# Patient Record
Sex: FEMALE | Race: NATIVE HAWAIIAN OR OTHER PACIFIC ISLANDER
[De-identification: names, ages, dates, MRNs, and addresses within clinical notes are randomized per-mention and may not be internally consistent; named-entity substitution may affect disease eponyms.]

---

## 2018-04-30 ENCOUNTER — HOSPITAL ENCOUNTER (INPATIENT)
Dept: HOSPITAL 14 - H.REHABAC | Age: 83
LOS: 33 days | Discharge: NURSING, REHAB OR LONG-TERM CARE | DRG: 57 | End: 2018-06-02
Attending: FAMILY MEDICINE | Admitting: FAMILY MEDICINE
Payer: MEDICARE

## 2018-04-30 VITALS — BODY MASS INDEX: 25.7 KG/M2

## 2018-04-30 DIAGNOSIS — Z79.84: ICD-10-CM

## 2018-04-30 DIAGNOSIS — R26.9: ICD-10-CM

## 2018-04-30 DIAGNOSIS — I10: ICD-10-CM

## 2018-04-30 DIAGNOSIS — Z79.899: ICD-10-CM

## 2018-04-30 DIAGNOSIS — Z91.013: ICD-10-CM

## 2018-04-30 DIAGNOSIS — Z86.010: ICD-10-CM

## 2018-04-30 DIAGNOSIS — R39.198: ICD-10-CM

## 2018-04-30 DIAGNOSIS — E78.5: ICD-10-CM

## 2018-04-30 DIAGNOSIS — Z88.6: ICD-10-CM

## 2018-04-30 DIAGNOSIS — E03.9: ICD-10-CM

## 2018-04-30 DIAGNOSIS — I69.322: ICD-10-CM

## 2018-04-30 DIAGNOSIS — Z88.0: ICD-10-CM

## 2018-04-30 DIAGNOSIS — E22.2: ICD-10-CM

## 2018-04-30 DIAGNOSIS — R33.9: ICD-10-CM

## 2018-04-30 DIAGNOSIS — E66.9: ICD-10-CM

## 2018-04-30 DIAGNOSIS — K55.9: ICD-10-CM

## 2018-04-30 DIAGNOSIS — Z86.011: ICD-10-CM

## 2018-04-30 DIAGNOSIS — I69.351: Primary | ICD-10-CM

## 2018-04-30 DIAGNOSIS — E11.9: ICD-10-CM

## 2018-04-30 DIAGNOSIS — J45.909: ICD-10-CM

## 2018-04-30 DIAGNOSIS — M81.0: ICD-10-CM

## 2018-04-30 DIAGNOSIS — Z92.21: ICD-10-CM

## 2018-04-30 DIAGNOSIS — Z88.2: ICD-10-CM

## 2018-04-30 DIAGNOSIS — F41.9: ICD-10-CM

## 2018-04-30 DIAGNOSIS — Z85.3: ICD-10-CM

## 2018-04-30 DIAGNOSIS — I69.328: ICD-10-CM

## 2018-04-30 DIAGNOSIS — R32: ICD-10-CM

## 2018-05-08 PROCEDURE — F07Z9FZ GAIT TRAINING/FUNCTIONAL AMBULATION TREATMENT USING ASSISTIVE, ADAPTIVE, SUPPORTIVE OR PROTECTIVE EQUIPMENT: ICD-10-PCS

## 2018-05-08 PROCEDURE — F08Z0ZZ BATHING/SHOWERING TECHNIQUES TREATMENT: ICD-10-PCS

## 2018-05-08 PROCEDURE — F07Z5ZZ BED MOBILITY TREATMENT: ICD-10-PCS

## 2018-05-08 PROCEDURE — F08Z4FZ HOME MANAGEMENT TREATMENT USING ASSISTIVE, ADAPTIVE, SUPPORTIVE OR PROTECTIVE EQUIPMENT: ICD-10-PCS

## 2018-05-08 PROCEDURE — F08Z1ZZ DRESSING TECHNIQUES TREATMENT: ICD-10-PCS

## 2018-05-08 PROCEDURE — F06Z6ZZ COMMUNICATIVE/COGNITIVE INTEGRATION SKILLS TREATMENT: ICD-10-PCS

## 2018-05-08 PROCEDURE — F07M6FZ THERAPEUTIC EXERCISE TREATMENT OF MUSCULOSKELETAL SYSTEM - WHOLE BODY USING ASSISTIVE, ADAPTIVE, SUPPORTIVE OR PROTECTIVE EQUIPMENT: ICD-10-PCS

## 2018-05-08 RX ADMIN — INSULIN LISPRO SCH: 100 INJECTION, SOLUTION INTRAVENOUS; SUBCUTANEOUS at 21:42

## 2018-05-08 RX ADMIN — STANDARDIZED SENNA CONCENTRATE AND DOCUSATE SODIUM SCH TAB: 8.6; 5 TABLET, FILM COATED ORAL at 21:41

## 2018-05-09 LAB
ALBUMIN SERPL-MCNC: 3.6 G/DL (ref 3.5–5)
ALBUMIN/GLOB SERPL: 1.2 {RATIO} (ref 1–2.1)
ALT SERPL-CCNC: 52 U/L (ref 9–52)
AST SERPL-CCNC: 36 U/L (ref 14–36)
BUN SERPL-MCNC: 32 MG/DL (ref 7–17)
CALCIUM SERPL-MCNC: 9.4 MG/DL (ref 8.4–10.2)
ERYTHROCYTE [DISTWIDTH] IN BLOOD BY AUTOMATED COUNT: 14.2 % (ref 11.5–14.5)
GFR NON-AFRICAN AMERICAN: 53
HDLC SERPL-MCNC: 40 MG/DL (ref 30–70)
HGB BLD-MCNC: 10.7 G/DL (ref 12–16)
LDLC SERPL-MCNC: 68 MG/DL (ref 0–129)
MCH RBC QN AUTO: 30.7 PG (ref 27–31)
MCHC RBC AUTO-ENTMCNC: 33.5 G/DL (ref 33–37)
MCV RBC AUTO: 91.5 FL (ref 81–99)
PLATELET # BLD: 343 K/UL (ref 130–400)
RBC # BLD AUTO: 3.47 MIL/UL (ref 3.8–5.2)
WBC # BLD AUTO: 7.4 K/UL (ref 4.8–10.8)

## 2018-05-09 RX ADMIN — INSULIN LISPRO SCH: 100 INJECTION, SOLUTION INTRAVENOUS; SUBCUTANEOUS at 21:16

## 2018-05-09 RX ADMIN — PANTOPRAZOLE SODIUM SCH MG: 40 TABLET, DELAYED RELEASE ORAL at 06:14

## 2018-05-09 RX ADMIN — STANDARDIZED SENNA CONCENTRATE AND DOCUSATE SODIUM SCH TAB: 8.6; 5 TABLET, FILM COATED ORAL at 21:15

## 2018-05-09 RX ADMIN — INSULIN LISPRO SCH U: 100 INJECTION, SOLUTION INTRAVENOUS; SUBCUTANEOUS at 08:00

## 2018-05-09 RX ADMIN — INSULIN LISPRO SCH U: 100 INJECTION, SOLUTION INTRAVENOUS; SUBCUTANEOUS at 12:15

## 2018-05-09 RX ADMIN — NIFEDIPINE SCH MG: 30 TABLET, FILM COATED, EXTENDED RELEASE ORAL at 09:11

## 2018-05-09 RX ADMIN — INSULIN LISPRO SCH U: 100 INJECTION, SOLUTION INTRAVENOUS; SUBCUTANEOUS at 16:45

## 2018-05-09 RX ADMIN — ENOXAPARIN SODIUM SCH MG: 40 INJECTION SUBCUTANEOUS at 09:10

## 2018-05-09 RX ADMIN — STANDARDIZED SENNA CONCENTRATE AND DOCUSATE SODIUM SCH TAB: 8.6; 5 TABLET, FILM COATED ORAL at 09:13

## 2018-05-09 NOTE — PCM.OPOC
Physiatry Overall Plan of Care





- Overall Plan of Care


Estimated Length of Stay in Weeks: 3


Rehab Impairment: Mobility, Gait, Speech, Balance, Coordination


Etiologic Diagnosis: Cerebrovascular Accident





- Anticipated Interventions


Physical Therapy:: Yes


Occupational Therapy:: Yes


Speech Therapy:: Yes


Recreational Therapy:: Yes





- Therapy Goals


Bed Mobility: Supervision


Ambulation: Minimal Assistance


Functional Positional Changes:: Supervision





- Discharge Plan


Identification of Barriers to Discharge: Home Situation


Discharge Destination: Home

## 2018-05-09 NOTE — CP.PCM.CON
History of Present Illness





- History of Present Illness


History of Present Illness: 





This patient is 82 years of age female [facility] because 

of acute CVA was weakness on the right side.


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


I was called to see the patient for further evaluation regarding hyponatremia..


Past medical history as noted acute CVA in diabetes mellitus hypertension. And 

patient in the rehabilitation Center now for acute CVA.


Social history noncontributory


Review of the 14 system unremarkable except for what mentioned in the history 

and physical.





Review of Systems





- Constitutional


Constitutional: Weakness.  absent: Chills





- Breasts


Breasts: As Per HPI





- Cardiovascular


Cardiovascular: absent: Acrocyanosis, Chest Pain, Dyspnea, Edema





- Respiratory


Respiratory: absent: Cough, Dyspnea, Hemoptysis





- Gastrointestinal


Gastrointestinal: absent: Abdominal Pain





- Musculoskeletal


Musculoskeletal: Abnormal Gait, Limited Range of Motion, Muscle Weakness.  

absent: As Per HPI





- Neurological


Neurological: As Per HPI, Abnormal Gait.  absent: Syncope





- Psychiatric


Psychiatric: Abnormal Sleep Pattern





- Hematologic/Lymphatic


Hematologic: absent: Easy Bleeding





Past Patient History





- Infectious Disease


Hx of Infectious Diseases: None





- Tetanus Immunizations


Tetanus Immunization: Up to Date





- Past Medical History & Family History


Past Medical History?: Yes





- Past Social History


Smoking Status: Never Smoked





- CARDIAC


Hx Hypertension: Yes





- PULMONARY


Hx Asthma: Yes





- NEUROLOGICAL


HX Cerebrovascular Accident: Yes (4/23/2018)


Hx Paralysis: No


Other/Comment: meningioma





- HEENT


Hx HEENT Problems: No





- ENDOCRINE/METABOLIC


Hx Diabetes Mellitus Type 2: Yes


Hx Hypothyroidism: Yes





- HEMATOLOGICAL/ONCOLOGICAL


Hx Cancer: Yes





- MUSCULOSKELETAL/RHEUMATOLOGICAL


Hx Falls: No


Hx Osteoporosis: Yes





- GASTROINTESTINAL


Hx Gastrointestinal Disorders: No


Other/Comment: + ISCHEMIC COLITIS , COLONIC POLYPS





- GENITOURINARY/GYNECOLOGICAL


Hx Genitourinary Disorders: No


Hx Incontinence: Yes





- PSYCHIATRIC


Hx Anxiety: Yes


Hx Substance Use: No





- SURGICAL HISTORY


Hx Surgeries: Yes


Hx Breast Biopsy: Yes (left lumpectomy S/P CHEMO)


Hx Mastectomy: Yes (right 1988, S/P CHEMO)


Other/Comment: LAPAROTOMY 1971





- ANESTHESIA


Hx Anesthesia: Yes


Hx Anesthesia Reactions: No


Hx Malignant Hyperthermia: No


Has any member of the family had a problem w/ anesthesia?: No





Meds


Allergies/Adverse Reactions: 


 Allergies











Allergy/AdvReac Type Severity Reaction Status Date / Time


 


iodine Allergy Mild RASH Verified 05/08/18 17:41


 


codeine Allergy  ANAPHYLAXIS Verified 05/08/18 17:41


 


FISH Allergy  ANAPHYLAXIS Verified 05/08/18 17:41


 


Penicillins Allergy  RASH Verified 05/08/18 17:41


 


shellfish derived Allergy  ANAPHYLAXIS Verified 05/08/18 17:41


 


Sulfa (Sulfonamide Allergy  ANAPHYLAXIS Verified 05/08/18 17:41





Antibiotics)     














- Medications


Medications: 


 Current Medications





Acetaminophen (Tylenol 325mg Tab)  325 mg PO Q6 PRN


   PRN Reason: Pain 1-10


Alprazolam (Xanax)  0.25 mg PO TID PRN


   PRN Reason: Anxiety


   Stop: 05/15/18 18:10


Aspirin (Aspirin Chewable)  81 mg PO DAILY [facility]


   Last Admin: 05/09/18 09:12 Dose:  81 mg


Atorvastatin Calcium (Lipitor)  40 mg PO HS [facility]


   Last Admin: 05/08/18 21:40 Dose:  40 mg


Clotrimazole (Lotrimin 1% Cream)  1 applic TOP 0600,1800 [facility]


Enoxaparin Sodium (Lovenox)  40 mg SC DAILY [facility]


   PRN Reason: Protocol


   Last Admin: 05/09/18 09:10 Dose:  40 mg


Hydralazine HCl (Apresoline)  50 mg PO Q8 [facility]


   Last Admin: 05/09/18 06:13 Dose:  50 mg


Insulin Human Lispro (Humalog)  0 units SC ACHS [facility]


   PRN Reason: Protocol


   Last Admin: 05/09/18 08:00 Dose:  2 u


Lactulose (Enulose)  10 gm PO DAILY PRN


   PRN Reason: Constipation


Levothyroxine Sodium (Synthroid)  25 mcg PO 0630 [facility]


   Last Admin: 05/09/18 06:14 Dose:  25 mcg


Losartan Potassium (Cozaar)  50 mg PO DAILY [facility]


   Last Admin: 05/09/18 09:11 Dose:  50 mg


Metformin HCl (Glucophage)  1,000 mg PO BIDWM [facility]


Metoprolol Tartrate (Lopressor)  50 mg PO Q12 [facility]


   Last Admin: 05/09/18 09:12 Dose:  50 mg


Nifedipine (Procardia Xl)  30 mg PO DAILY [facility]


   Last Admin: 05/09/18 09:11 Dose:  30 mg


Pantoprazole Sodium (Protonix Ec Tab)  40 mg PO 0630 [facility]


   Last Admin: 05/09/18 06:14 Dose:  40 mg


Senna/Docusate Sodium (Senokot S 50 Mg-8.6 Mg)  2 tab PO Q12 [facility]


   Last Admin: 05/09/18 09:13 Dose:  2 tab


Sitagliptin Phosphate (Januvia)  100 mg PO DAILY [facility]











Physical Exam





- Constitutional


Appears: No Acute Distress





- ENT Exam


ENT Exam: Mucous Membranes Moist





- Neck Exam


Neck exam: Negative for: Lymphadenopathy





- Respiratory Exam


Respiratory Exam: NORMAL BREATHING PATTERN.  absent: Chest Wall Tenderness, 

Rhonchi, Wheezes





- Cardiovascular Exam


Cardiovascular Exam: absent: Gallop, JVD, Rubs





- GI/Abdominal Exam


GI & Abdominal Exam: absent: Distended, Guarding, Normal Bowel Sounds





- Extremities Exam


Extremities exam: Negative for: calf tenderness





- Back Exam


Back exam: absent: CVA tenderness (L), CVA tenderness (R)





- Neurological Exam


Neurological exam: Alert





- Psychiatric Exam


Psychiatric exam: Normal Affect





Results





- Vital Signs


Recent Vital Signs: 


 Last Vital Signs











Temp  97.3 F L  05/09/18 09:04


 


Pulse  88   05/09/18 09:12


 


Resp  20   05/09/18 09:04


 


BP  135/60   05/09/18 09:12


 


Pulse Ox  97   05/09/18 09:04














- Labs


Result Diagrams: 


 05/09/18 05:40





 05/09/18 05:40


Labs: 


 Laboratory Results - last 24 hr











  05/08/18 05/09/18 05/09/18





  21:15 05:32 05:40


 


WBC    7.4


 


RBC    3.47 L


 


Hgb    10.7 L


 


Hct    31.8 L


 


MCV    91.5


 


MCH    30.7


 


MCHC    33.5


 


RDW    14.2


 


Plt Count    343


 


Sodium   


 


Potassium   


 


Chloride   


 


Carbon Dioxide   


 


Anion Gap   


 


BUN   


 


Creatinine   


 


Est GFR ( Amer)   


 


Est GFR (Non-Af Amer)   


 


POC Glucose (mg/dL)  252 H  157 H 


 


Random Glucose   


 


Hemoglobin A1c   


 


Calcium   


 


Total Bilirubin   


 


AST   


 


ALT   


 


Alkaline Phosphatase   


 


Total Protein   


 


Albumin   


 


Globulin   


 


Albumin/Globulin Ratio   


 


Triglycerides   


 


Cholesterol   


 


LDL Cholesterol Direct   


 


HDL Cholesterol   


 


TSH 3rd Generation   














  05/09/18 05/09/18





  05:40 05:40


 


WBC  


 


RBC  


 


Hgb  


 


Hct  


 


MCV  


 


MCH  


 


MCHC  


 


RDW  


 


Plt Count  


 


Sodium  134 


 


Potassium  3.9 


 


Chloride  100 


 


Carbon Dioxide  18 L 


 


Anion Gap  20 


 


BUN  32 H 


 


Creatinine  1.0 


 


Est GFR ( Amer)  > 60 


 


Est GFR (Non-Af Amer)  53 


 


POC Glucose (mg/dL)  


 


Random Glucose  157 H 


 


Hemoglobin A1c   7.5 H


 


Calcium  9.4 


 


Total Bilirubin  0.5 


 


AST  36 


 


ALT  52 


 


Alkaline Phosphatase  46 


 


Total Protein  6.5 


 


Albumin  3.6 


 


Globulin  2.9 


 


Albumin/Globulin Ratio  1.2 


 


Triglycerides  99 


 


Cholesterol  136 


 


LDL Cholesterol Direct  68 


 


HDL Cholesterol  40 


 


TSH 3rd Generation  4.32 














Assessment & Plan


(1) Hyponatremia


Assessment and Plan: 


Patient prongs from from [facility] with diagnosis of SIADH. With 

hyponatremia she has been receiving apparently intermittently  Samsca.


No serum sodium 134 therefore we will hold Samsca and keep monitoring serum 

sodium we will repeat tomorrow.


Patient also brought because of acute CVA and she is receiving physiotherapy.


Patient also diabetic continual glycemic and diabetic control.


Status: Acute

## 2018-05-09 NOTE — CP.PCM.HP
History of Present Illness





- History of Present Illness


History of Present Illness: 





81 yo,f, PMhx/o HTN, DM, HLD, Hypothyroidism, B/L  breast Ca s/p chemo 1998, 

CVA 4/23/18 admitted and treated  in [facility] [place], who was admitted in acute rehab for 

rehabilitation. Patient on admission 4/23/18 was c/o right side hemiplegia, 

slurred speech, that is still residual. Patient on evaluation noticed with 

dysarthria, able to talk with some difficult. She denies chest pain, SOB, fever

, cough, n,v,d,abd pain





PMHX: HTN, DM, HLD, hypothyroidism, B/L  breast Ca s/p chemo 1998, CVA 4/23/18


Allergies: Sulfa, codeine, shellfish, penicillin








Present on Admission





- Present on Admission


Any Indicators Present on Admission: No


History of DVT/PE: No


History of Uncontrolled Diabetes: No


Urinary Catheter: No


Decubitus Ulcer Present: No





Review of Systems





- Review of Systems


All systems: reviewed and no additional remarkable complaints except





- Cardiovascular


Cardiovascular: As Per HPI





- Respiratory


Respiratory: As Per HPI





- Neurological


Neurological: Weakness





Past Patient History





- Infectious Disease


Hx of Infectious Diseases: None





- Tetanus Immunizations


Tetanus Immunization: Up to Date





- Past Medical History & Family History


Past Medical History?: Yes





- Past Social History


Smoking Status: Never Smoked





- CARDIAC


Hx Hypertension: Yes





- PULMONARY


Hx Asthma: Yes





- NEUROLOGICAL


HX Cerebrovascular Accident: Yes (4/23/2018)


Hx Paralysis: No


Other/Comment: meningioma





- HEENT


Hx HEENT Problems: No





- ENDOCRINE/METABOLIC


Hx Diabetes Mellitus Type 2: Yes


Hx Hypothyroidism: Yes





- HEMATOLOGICAL/ONCOLOGICAL


Hx Cancer: Yes





- MUSCULOSKELETAL/RHEUMATOLOGICAL


Hx Falls: No


Hx Osteoporosis: Yes





- GASTROINTESTINAL


Hx Gastrointestinal Disorders: No


Other/Comment: + ISCHEMIC COLITIS , COLONIC POLYPS





- GENITOURINARY/GYNECOLOGICAL


Hx Genitourinary Disorders: No


Hx Incontinence: Yes





- PSYCHIATRIC


Hx Anxiety: Yes


Hx Substance Use: No





- SURGICAL HISTORY


Hx Surgeries: Yes


Hx Breast Biopsy: Yes (left lumpectomy S/P CHEMO)


Hx Mastectomy: Yes (right 1988, S/P CHEMO)


Other/Comment: LAPAROTOMY 1971





- ANESTHESIA


Hx Anesthesia: Yes


Hx Anesthesia Reactions: No


Hx Malignant Hyperthermia: No


Has any member of the family had a problem w/ anesthesia?: No





Meds


Allergies/Adverse Reactions: 


 Allergies











Allergy/AdvReac Type Severity Reaction Status Date / Time


 


iodine Allergy Mild RASH Verified 05/08/18 17:41


 


codeine Allergy  ANAPHYLAXIS Verified 05/08/18 17:41


 


FISH Allergy  ANAPHYLAXIS Verified 05/08/18 17:41


 


Penicillins Allergy  RASH Verified 05/08/18 17:41


 


shellfish derived Allergy  ANAPHYLAXIS Verified 05/08/18 17:41


 


Sulfa (Sulfonamide Allergy  ANAPHYLAXIS Verified 05/08/18 17:41





Antibiotics)     














Physical Exam





- Constitutional


Appears: Toxic, No Acute Distress





- Head Exam


Head Exam: ATRAUMATIC, NORMOCEPHALIC





- Eye Exam


Eye Exam: Normal appearance





- ENT Exam


ENT Exam: Mucous Membranes Moist





- Neck Exam


Neck exam: Positive for: Normal Inspection





- Respiratory Exam


Respiratory Exam: Clear to Auscultation Bilateral.  absent: Rales, Rhonchi, 

Wheezes, Stridor





- Cardiovascular Exam


Cardiovascular Exam: REGULAR RHYTHM, +S1, +S2





- GI/Abdominal Exam


GI & Abdominal Exam: Normal Bowel Sounds, Soft.  absent: Guarding, Rebound, 

Tenderness





- Extremities Exam


Extremities exam: Negative for: calf tenderness, pedal edema





Results





- Vital Signs


Recent Vital Signs: 





 Last Vital Signs











Temp  97.3 F L  05/09/18 09:04


 


Pulse  68   05/09/18 13:14


 


Resp  20   05/09/18 09:04


 


BP  130/63   05/09/18 13:14


 


Pulse Ox  97   05/09/18 09:04














- Labs


Result Diagrams: 


 05/09/18 05:40





 05/09/18 05:40


Labs: 





 Laboratory Results - last 24 hr











  05/08/18 05/09/18 05/09/18





  21:15 05:32 05:40


 


WBC    7.4


 


RBC    3.47 L


 


Hgb    10.7 L


 


Hct    31.8 L


 


MCV    91.5


 


MCH    30.7


 


MCHC    33.5


 


RDW    14.2


 


Plt Count    343


 


Sodium   


 


Potassium   


 


Chloride   


 


Carbon Dioxide   


 


Anion Gap   


 


BUN   


 


Creatinine   


 


Est GFR ( Amer)   


 


Est GFR (Non-Af Amer)   


 


POC Glucose (mg/dL)  252 H  157 H 


 


Random Glucose   


 


Hemoglobin A1c   


 


Calcium   


 


Total Bilirubin   


 


AST   


 


ALT   


 


Alkaline Phosphatase   


 


Total Protein   


 


Albumin   


 


Globulin   


 


Albumin/Globulin Ratio   


 


Triglycerides   


 


Cholesterol   


 


LDL Cholesterol Direct   


 


HDL Cholesterol   


 


TSH 3rd Generation   














  05/09/18 05/09/18 05/09/18





  05:40 05:40 11:59


 


WBC   


 


RBC   


 


Hgb   


 


Hct   


 


MCV   


 


MCH   


 


MCHC   


 


RDW   


 


Plt Count   


 


Sodium  134  


 


Potassium  3.9  


 


Chloride  100  


 


Carbon Dioxide  18 L  


 


Anion Gap  20  


 


BUN  32 H  


 


Creatinine  1.0  


 


Est GFR ( Amer)  > 60  


 


Est GFR (Non-Af Amer)  53  


 


POC Glucose (mg/dL)    206 H


 


Random Glucose  157 H  


 


Hemoglobin A1c   7.5 H 


 


Calcium  9.4  


 


Total Bilirubin  0.5  


 


AST  36  


 


ALT  52  


 


Alkaline Phosphatase  46  


 


Total Protein  6.5  


 


Albumin  3.6  


 


Globulin  2.9  


 


Albumin/Globulin Ratio  1.2  


 


Triglycerides  99  


 


Cholesterol  136  


 


LDL Cholesterol Direct  68  


 


HDL Cholesterol  40  


 


TSH 3rd Generation  4.32  














Assessment & Plan





- Assessment and Plan (Free Text)


Plan: 





Assessment/Plan 





1) CVA


-chronic with  residual right hemiparesis


-record on paper chart: MRI brain left pontine infarct. 2 right sphenoid 

meningiomas. 


-Pt/OT


-speech therapy 





2) DM


hga1c 7.3


-Controlled for her age


-metformin 1000 BID


-Januvia 100 mg daily 





3) HTN


-c/w losartan,metropolol





4) HLD


c/w Lipitor 40 mg daily 








5) Hypothyroidism


c/w home medications





6) Hyponatremia


-may secondary to SIADH caused for medication 


-Nephrology consult appreciated: edgar tolvactan


-f/u BMP





7) DVT prophylaxis


-SCD

## 2018-05-09 NOTE — CP.PCM.CON
History of Present Illness





- History of Present Illness


History of Present Illness: 





82 year old female with CVa admitted for acute reab with diagnosis of CVA, HTn, 

hypothyroidism, breast CA





Review of Systems





- Neurological


Neurological: Abnormal Gait, Weakness





Past Patient History





- Infectious Disease


Hx of Infectious Diseases: None





- Tetanus Immunizations


Tetanus Immunization: Up to Date





- Past Medical History & Family History


Past Medical History?: Yes





- Past Social History


Smoking Status: Never Smoked





- CARDIAC


Hx Hypertension: Yes





- PULMONARY


Hx Asthma: Yes





- NEUROLOGICAL


HX Cerebrovascular Accident: Yes (4/23/2018)


Hx Paralysis: No


Other/Comment: meningioma





- HEENT


Hx HEENT Problems: No





- ENDOCRINE/METABOLIC


Hx Diabetes Mellitus Type 2: Yes


Hx Hypothyroidism: Yes





- HEMATOLOGICAL/ONCOLOGICAL


Hx Cancer: Yes





- MUSCULOSKELETAL/RHEUMATOLOGICAL


Hx Falls: No


Hx Osteoporosis: Yes





- GASTROINTESTINAL


Hx Gastrointestinal Disorders: No


Other/Comment: + ISCHEMIC COLITIS , COLONIC POLYPS





- GENITOURINARY/GYNECOLOGICAL


Hx Genitourinary Disorders: No


Hx Incontinence: Yes





- PSYCHIATRIC


Hx Anxiety: Yes


Hx Substance Use: No





- SURGICAL HISTORY


Hx Surgeries: Yes


Hx Breast Biopsy: Yes (left lumpectomy S/P CHEMO)


Hx Mastectomy: Yes (right 1988, S/P CHEMO)


Other/Comment: LAPAROTOMY 1971





- ANESTHESIA


Hx Anesthesia: Yes


Hx Anesthesia Reactions: No


Hx Malignant Hyperthermia: No


Has any member of the family had a problem w/ anesthesia?: No





Meds


Allergies/Adverse Reactions: 


 Allergies











Allergy/AdvReac Type Severity Reaction Status Date / Time


 


iodine Allergy Mild RASH Verified 05/08/18 17:41


 


codeine Allergy  ANAPHYLAXIS Verified 05/08/18 17:41


 


FISH Allergy  ANAPHYLAXIS Verified 05/08/18 17:41


 


Penicillins Allergy  RASH Verified 05/08/18 17:41


 


shellfish derived Allergy  ANAPHYLAXIS Verified 05/08/18 17:41


 


Sulfa (Sulfonamide Allergy  ANAPHYLAXIS Verified 05/08/18 17:41





Antibiotics)     














- Medications


Medications: 


 Current Medications





Acetaminophen (Tylenol 325mg Tab)  325 mg PO Q6 PRN


   PRN Reason: Pain 1-10


Alprazolam (Xanax)  0.25 mg PO TID PRN


   PRN Reason: Anxiety


   Stop: 05/15/18 18:10


Aspirin (Aspirin Chewable)  81 mg PO DAILY [facility]


   Last Admin: 05/09/18 09:12 Dose:  81 mg


Atorvastatin Calcium (Lipitor)  40 mg PO HS [facility]


   Last Admin: 05/08/18 21:40 Dose:  40 mg


Clotrimazole (Lotrimin 1% Cream)  1 applic TOP 0600,1800 [facility]


Enoxaparin Sodium (Lovenox)  40 mg SC DAILY [facility]


   PRN Reason: Protocol


   Last Admin: 05/09/18 09:10 Dose:  40 mg


Hydralazine HCl (Apresoline)  50 mg PO Q8 [facility]


   Last Admin: 05/09/18 13:14 Dose:  50 mg


Insulin Human Lispro (Humalog)  0 units SC ACHS [facility]


   PRN Reason: Protocol


   Last Admin: 05/09/18 12:15 Dose:  3 u


Lactulose (Enulose)  10 gm PO DAILY [facility]N


   PRN Reason: Constipation


Levothyroxine Sodium (Synthroid)  25 mcg PO 0630 [facility]


   Last Admin: 05/09/18 06:14 Dose:  25 mcg


Losartan Potassium (Cozaar)  50 mg PO DAILY [facility]


   Last Admin: 05/09/18 09:11 Dose:  50 mg


Metformin HCl (Glucophage)  1,000 mg PO BIDWM [facility]


Metoprolol Tartrate (Lopressor)  50 mg PO Q12 [facility]


   Last Admin: 05/09/18 09:12 Dose:  50 mg


Nifedipine (Procardia Xl)  30 mg PO DAILY [facility]


   Last Admin: 05/09/18 09:11 Dose:  30 mg


Pantoprazole Sodium (Protonix Ec Tab)  40 mg PO 0630 [facility]


   Last Admin: 05/09/18 06:14 Dose:  40 mg


Senna/Docusate Sodium (Senokot S 50 Mg-8.6 Mg)  2 tab PO Q12 [facility]


   Last Admin: 05/09/18 09:13 Dose:  2 tab


Sitagliptin Phosphate (Januvia)  100 mg PO DAILY [facility]











Physical Exam





- Head Exam


Head Exam: ATRAUMATIC, NORMAL INSPECTION, NORMOCEPHALIC





- Eye Exam


Eye Exam: EOMI, Normal appearance, PERRL


Pupil Exam: NORMAL ACCOMODATION, PERRL





- ENT Exam


ENT Exam: Mucous Membranes Moist, Normal Exam





- Neck Exam


Neck exam: Positive for: Normal Inspection





- Respiratory Exam


Respiratory Exam: NORMAL BREATHING PATTERN





- Cardiovascular Exam


Cardiovascular Exam: REGULAR RHYTHM





- GI/Abdominal Exam


GI & Abdominal Exam: Normal Bowel Sounds





- Rectal Exam


Rectal Exam: NORMAL INSPECTION





-  Exam


External exam: NORMAL EXTERNAL EXAM





- Extremities Exam


Extremities exam: Positive for: normal inspection


Additional comments: 





right arm with increased tone, nonfunctional  right leg muscle strength is trace





- Back Exam


Back exam: NORMAL INSPECTION





- Neurological Exam


Neurological exam: Alert, CN II-XII Intact





- Psychiatric Exam


Psychiatric exam: Normal Affect, Normal Mood





- Skin


Skin Exam: Dry, Intact, Normal Color





Results





- Vital Signs


Recent Vital Signs: 


 Last Vital Signs











Temp  97.3 F L  05/09/18 09:04


 


Pulse  68   05/09/18 13:14


 


Resp  20   05/09/18 09:04


 


BP  130/63   05/09/18 13:14


 


Pulse Ox  97   05/09/18 09:04














- Labs


Result Diagrams: 


 05/09/18 05:40





 05/09/18 05:40


Labs: 


 Laboratory Results - last 24 hr











  05/08/18 05/09/18 05/09/18





  21:15 05:32 05:40


 


WBC    7.4


 


RBC    3.47 L


 


Hgb    10.7 L


 


Hct    31.8 L


 


MCV    91.5


 


MCH    30.7


 


MCHC    33.5


 


RDW    14.2


 


Plt Count    343


 


Sodium   


 


Potassium   


 


Chloride   


 


Carbon Dioxide   


 


Anion Gap   


 


BUN   


 


Creatinine   


 


Est GFR ( Amer)   


 


Est GFR (Non-Af Amer)   


 


POC Glucose (mg/dL)  252 H  157 H 


 


Random Glucose   


 


Hemoglobin A1c   


 


Calcium   


 


Total Bilirubin   


 


AST   


 


ALT   


 


Alkaline Phosphatase   


 


Total Protein   


 


Albumin   


 


Globulin   


 


Albumin/Globulin Ratio   


 


Triglycerides   


 


Cholesterol   


 


LDL Cholesterol Direct   


 


HDL Cholesterol   


 


TSH 3rd Generation   














  05/09/18 05/09/18 05/09/18





  05:40 05:40 11:59


 


WBC   


 


RBC   


 


Hgb   


 


Hct   


 


MCV   


 


MCH   


 


MCHC   


 


RDW   


 


Plt Count   


 


Sodium  134  


 


Potassium  3.9  


 


Chloride  100  


 


Carbon Dioxide  18 L  


 


Anion Gap  20  


 


BUN  32 H  


 


Creatinine  1.0  


 


Est GFR ( Amer)  > 60  


 


Est GFR (Non-Af Amer)  53  


 


POC Glucose (mg/dL)    206 H


 


Random Glucose  157 H  


 


Hemoglobin A1c   7.5 H 


 


Calcium  9.4  


 


Total Bilirubin  0.5  


 


AST  36  


 


ALT  52  


 


Alkaline Phosphatase  46  


 


Total Protein  6.5  


 


Albumin  3.6  


 


Globulin  2.9  


 


Albumin/Globulin Ratio  1.2  


 


Triglycerides  99  


 


Cholesterol  136  


 


LDL Cholesterol Direct  68  


 


HDL Cholesterol  40  


 


TSH 3rd Generation  4.32  














Assessment & Plan


(1) CVA (cerebral vascular accident)


Assessment and Plan: 


patient for physical, occupational rec and speech therapy  for range of motion, 

strengthening transfers and gait training to write for overall plan of care


Status: Acute   





(2) Hyponatremia


Status: Acute   





(3) Anxiety


Status: Acute   





(4) Benign hypertension


Status: Acute   





(5) HTN (hypertension)


Status: Acute

## 2018-05-09 NOTE — PSY.TMCNF
Nursing





- Vital Signs


Vital Signs (Last 8 hours): 


 Vital Signs











  05/09/18 05/09/18 05/09/18





  06:13 09:04 09:11


 


Temperature  97.3 F L 


 


Pulse Rate 62 88 88


 


Respiratory  20 





Rate   


 


Blood Pressure 123/64 135/60 135/60


 


O2 Sat by Pulse  97 





Oximetry   














  05/09/18





  09:12


 


Temperature 


 


Pulse Rate 88


 


Respiratory 





Rate 


 


Blood Pressure 135/60


 


O2 Sat by Pulse 





Oximetry 














- Medications/Other Issues


Comment: Pt to be seen for initial assessment by 5/11/18.





- Bladder Management


Bladder Pattern: Incontinent





- Bowel Management


Bowel Pattern: Incontinent





- Patient/Family Teaching


Comments: N/A





Physical Therapy





- Ambulation


Level of Assistance: Moderate Assistance, Maximum Assistance





- Assessment/Plan


Assessment: Rosa Alves presents with 1.) mild dysarthria characterized by 

impaired coordination of oral motor movements with impaired respiration for 

phonation, negatively impacted articulatory precision and intelligibility; 2.) 

mild-moderate cognitive linguistic deficits characterized by impaired short-

term recall, problem solving, and thought organization; and 3.) mild oral 

dysphagia and suspected mild pharyngeal dysphagia characterized by impaired 

bolus control with thin liquids via straw sips, impaired mastication and A-P 

transit time with solids, pt c/o requiring "effort" for food to "go down" with 

globus sensation (improves with liquid wash), and throat clearing with thin 

liquids via straw sips; no overt s/s aspiration with thins via small, single 

cup sips or all other consistencies tested. Recommend diet downgrade to 

mechanical soft bite-sized solids and thin liquids. Pt would benefit from 

speech and dysphagia tx for improved swallow function and safety for PO intake, 

and improved communicative effectiveness and cognition.





- Provider


License Number: [id number]





Occupational Therapy





- Arousal/Attention/Orientation


Patient Orientation: Person, Place, Time, Appropriate to Age, Appropriate to 

Situation





- Assessment/Plan


Assessment: Rosa Alves presents with 1.) mild dysarthria characterized by 

impaired coordination of oral motor movements with impaired respiration for 

phonation, negatively impacted articulatory precision and intelligibility; 2.) 

mild-moderate cognitive linguistic deficits characterized by impaired short-

term recall, problem solving, and thought organization; and 3.) mild oral 

dysphagia and suspected mild pharyngeal dysphagia characterized by impaired 

bolus control with thin liquids via straw sips, impaired mastication and A-P 

transit time with solids, pt c/o requiring "effort" for food to "go down" with 

globus sensation (improves with liquid wash), and throat clearing with thin 

liquids via straw sips; no overt s/s aspiration with thins via small, single 

cup sips or all other consistencies tested. Recommend diet downgrade to 

mechanical soft bite-sized solids and thin liquids. Pt would benefit from 

speech and dysphagia tx for improved swallow function and safety for PO intake, 

and improved communicative effectiveness and cognition.





Speech Therapy





- Consult Information


Patient on Program: Yes


Medical Diagnosis: CVA


Treatment Diagnosis: -mild dysarthria.  -mild-moderate cognitive linguistic 

deficits.  -mild dysphagia





- Assessment


Problem Solving Impairment: Mild


Memory Impairment: Moderate


Speech/Articulation Impairment: Mild


Dysphagia/Swallowing Impairment: Mild





- Plan


Assessment: Rosa Alves presents with 1.) mild dysarthria characterized by 

impaired coordination of oral motor movements with impaired respiration for 

phonation, negatively impacted articulatory precision and intelligibility; 2.) 

mild-moderate cognitive linguistic deficits characterized by impaired short-

term recall, problem solving, and thought organization; and 3.) mild oral 

dysphagia and suspected mild pharyngeal dysphagia characterized by impaired 

bolus control with thin liquids via straw sips, impaired mastication and A-P 

transit time with solids, pt c/o requiring "effort" for food to "go down" with 

globus sensation (improves with liquid wash), and throat clearing with thin 

liquids via straw sips; no overt s/s aspiration with thins via small, single 

cup sips or all other consistencies tested. Recommend diet downgrade to 

mechanical soft bite-sized solids and thin liquids. Pt would benefit from 

speech and dysphagia tx for improved swallow function and safety for PO intake, 

and improved communicative effectiveness and cognition.


Plan: Continue Dysphagia Therapy, Continue Speech/Language Therapy


Frequency: 3-5 times per week


Duration: 1 week


Goals/Timeframe: Please see IE completed 5/9/18 for goals/POC


Recommendations: -Speech/dysphagia tx 3-5x/week.  -Downgrade diet to mechanical 

soft bite-sized solids/thin liquids





- Provider


Therapist: Bhumi Oh


License Number: [id number]





Recreational Therapy





- Assessment


Assessment/Plan: Rosa Alves presents with 1.) mild dysarthria 

characterized by impaired coordination of oral motor movements with impaired 

respiration for phonation, negatively impacted articulatory precision and 

intelligibility; 2.) mild-moderate cognitive linguistic deficits characterized 

by impaired short-term recall, problem solving, and thought organization; and 3.

) mild oral dysphagia and suspected mild pharyngeal dysphagia characterized by 

impaired bolus control with thin liquids via straw sips, impaired mastication 

and A-P transit time with solids, pt c/o requiring "effort" for food to "go down

" with globus sensation (improves with liquid wash), and throat clearing with 

thin liquids via straw sips; no overt s/s aspiration with thins via small, 

single cup sips or all other consistencies tested. Recommend diet downgrade to 

mechanical soft bite-sized solids and thin liquids. Pt would benefit from 

speech and dysphagia tx for improved swallow function and safety for PO intake, 

and improved communicative effectiveness and cognition.





Nutrition





- Current Diet


Current Diet/ Supplement/ Feedings: Regular diet with 1000 mL fluid restriction





- Appetite


Percent Meal Consumed: 50-74%





- Comments


Comments: N/A





- Assessment/Goals/Time Frame


Assessment/Goals/Time Frame: Pt to be seen for initial assessment by 5/11/18.





- Provider


Provider: Carley Gabriel MS, RD





Case Management





- Discharge Plan


Discharge Plan: Home with significant other/family





Rehabilitation Plan





- Treatment Plan


Treatment Plan: Speech, Dietary





- Recommendation


Recommendation: Physical Therapy, Occupational Therapy, Speech, Dietary





- Discharge Plan


Discharge to: Home, Subacute

## 2018-05-09 NOTE — PN
DATE:  05/09/2018



PHYSIATRY PROGRESS NOTE



LOCATION:  The patient is in room 630.



SUBJECTIVE:  An 82-year-old female admitted last night because of acute CVA

and also history of SIADH.



PHYSICAL EXAMINATION:

VITAL SIGNS:  Stable.

NECK:  Supple.

CHEST:  Symmetrical.

HEART:  Sounds S1 and S2.

GASTROINTESTINAL:  Abdominal _____ is benign.

EXTREMITIES:  No clubbing, cyanosis or edema.

NEUROLOGIC:  The patient with right upper extremity, nonfunctional,

increased tone in the right arm, and also increased tone in the right leg. 

Muscle strength is trace.



IMPRESSION:  For the patient is cerebrovascular accident, syndrome of

inappropriate antidiuretic hormone secretion.



PLAN:  Physical therapy, occupational therapy, recreational therapy and

speech therapy.  Status post team conference to discharge date at present

to write for electrical stimulation for the arm and leg.





__________________________________________

Bucky Mckeon MD





DD:  05/09/2018 13:24:57

DT:  05/09/2018 14:04:21

Job # [id number]

## 2018-05-10 LAB
BUN SERPL-MCNC: 30 MG/DL (ref 7–17)
CALCIUM SERPL-MCNC: 9.4 MG/DL (ref 8.4–10.2)
GFR NON-AFRICAN AMERICAN: > 60

## 2018-05-10 RX ADMIN — STANDARDIZED SENNA CONCENTRATE AND DOCUSATE SODIUM SCH TAB: 8.6; 5 TABLET, FILM COATED ORAL at 08:57

## 2018-05-10 RX ADMIN — INSULIN LISPRO SCH U: 100 INJECTION, SOLUTION INTRAVENOUS; SUBCUTANEOUS at 11:45

## 2018-05-10 RX ADMIN — INSULIN LISPRO SCH: 100 INJECTION, SOLUTION INTRAVENOUS; SUBCUTANEOUS at 21:57

## 2018-05-10 RX ADMIN — ENOXAPARIN SODIUM SCH MG: 40 INJECTION SUBCUTANEOUS at 08:57

## 2018-05-10 RX ADMIN — STANDARDIZED SENNA CONCENTRATE AND DOCUSATE SODIUM SCH TAB: 8.6; 5 TABLET, FILM COATED ORAL at 21:49

## 2018-05-10 RX ADMIN — INSULIN LISPRO SCH: 100 INJECTION, SOLUTION INTRAVENOUS; SUBCUTANEOUS at 17:00

## 2018-05-10 RX ADMIN — PANTOPRAZOLE SODIUM SCH MG: 40 TABLET, DELAYED RELEASE ORAL at 05:55

## 2018-05-10 RX ADMIN — INSULIN LISPRO SCH: 100 INJECTION, SOLUTION INTRAVENOUS; SUBCUTANEOUS at 06:38

## 2018-05-10 RX ADMIN — NIFEDIPINE SCH MG: 30 TABLET, FILM COATED, EXTENDED RELEASE ORAL at 08:58

## 2018-05-10 NOTE — CP.PCM.PN
Subjective





- Date & Time of Evaluation


Date of Evaluation: 05/10/18


Time of Evaluation: 07:20





- Subjective


Subjective: 





Patient seen and examined bedside with Dr Hernandez. Patient  doing better with 

Physical therapy. dines chest pain, SOB,f,n,v,d. tolerating diet.no overnight 

events. 





Objective





- Vital Signs/Intake and Output


Vital Signs (last 24 hours): 


 











Temp Pulse Resp BP Pulse Ox


 


 97.0 F L  66   19   137/59 L  96 


 


 05/10/18 08:41  05/10/18 13:03  05/10/18 08:41  05/10/18 13:03  05/10/18 08:41








Intake and Output: 


 











 05/10/18 05/10/18





 06:59 18:59


 


Intake Total 150 


 


Balance 150 














- Medications


Medications: 


 Current Medications





Acetaminophen (Tylenol 325mg Tab)  325 mg PO Q6 PRN


   PRN Reason: Pain 1-10


Alprazolam (Xanax)  0.25 mg PO TID PRN


   PRN Reason: Anxiety


   Stop: 05/15/18 18:10


   Last Admin: 05/09/18 21:16 Dose:  0.25 mg


Aspirin (Aspirin Chewable)  81 mg PO DAILY [facility]


   Last Admin: 05/10/18 08:58 Dose:  81 mg


Atorvastatin Calcium (Lipitor)  40 mg PO HS [facility]


   Last Admin: 05/09/18 21:16 Dose:  40 mg


Clotrimazole (Lotrimin 1% Cream)  1 applic TOP 0600,1800 [facility]


   Last Admin: 05/10/18 06:19 Dose:  1 applic


Enoxaparin Sodium (Lovenox)  40 mg SC DAILY [facility]


   PRN Reason: Protocol


   Last Admin: 05/10/18 08:57 Dose:  40 mg


Hydralazine HCl (Apresoline)  50 mg PO Q8 [facility]


   Last Admin: 05/10/18 13:03 Dose:  50 mg


Insulin Human Lispro (Humalog)  0 units SC ACHS [facility]


   PRN Reason: Protocol


   Last Admin: 05/10/18 11:45 Dose:  3 u


Lactulose (Enulose)  10 gm PO DAILY [facility]N


   PRN Reason: Constipation


Levothyroxine Sodium (Synthroid)  25 mcg PO 0630 [facility]


   Last Admin: 05/10/18 05:55 Dose:  25 mcg


Losartan Potassium (Cozaar)  50 mg PO DAILY [facility]


   Last Admin: 05/10/18 08:59 Dose:  50 mg


Metformin HCl (Glucophage)  1,000 mg PO BIDWM [facility]


   Last Admin: 05/10/18 08:57 Dose:  1,000 mg


Metoprolol Tartrate (Lopressor)  100 mg PO Q12 [facility]


   Last Admin: 05/10/18 12:00 Dose:  Not Given


Nifedipine (Procardia Xl)  30 mg PO DAILY [facility]


   Last Admin: 05/10/18 08:58 Dose:  30 mg


Pantoprazole Sodium (Protonix Ec Tab)  40 mg PO 0630 [facility]


   Last Admin: 05/10/18 05:55 Dose:  40 mg


Senna/Docusate Sodium (Senokot S 50 Mg-8.6 Mg)  2 tab PO Q12 [facility]


   Last Admin: 05/10/18 08:57 Dose:  2 tab


Sitagliptin Phosphate (Januvia)  100 mg PO DAILY [facility]


   Last Admin: 05/10/18 08:57 Dose:  100 mg











- Labs


Labs: 


 





 05/09/18 05:40 





 05/10/18 05:40 











- Constitutional


Appears: Non-toxic, No Acute Distress





- Head Exam


Head Exam: ATRAUMATIC, NORMOCEPHALIC





- Eye Exam


Eye Exam: EOMI, Normal appearance





- ENT Exam


ENT Exam: Mucous Membranes Moist





- Neck Exam


Neck Exam: Full ROM





- Respiratory Exam


Respiratory Exam: Clear to Ausculation Bilateral.  absent: Rhonchi, Wheezes





- Cardiovascular Exam


Cardiovascular Exam: REGULAR RHYTHM, +S1, +S2





- Neurological Exam


Neurological Exam: Alert, Awake


Neuro motor strength exam: Right Upper Extremity: 3, Right Lower Extremity: 3


Additional comments: 





residual right hemiparesis, dysarhtria





- Psychiatric Exam


Psychiatric exam: Normal Affect, Normal Mood





- Skin


Skin Exam: Intact





Assessment and Plan





- Assessment and Plan (Free Text)


Plan: 





Assessment/Plan 





1) CVA


-chronic with  residual right hemiparesis


-record on paper chart: MRI brain left pontine infarct. 2 right sphenoid 

meningiomas. 


-Pt/OT


-speech therapy 





2) DM


hga1c 7.3


-Controlled for her age


-metformin 1000 BID


-Januvia 100 mg daily 





3) HTN


-c/w losartan,


Metoprolol Tartrate increased to 100 mg BID





4) HLD


c/w Lipitor 40 mg daily 








5) Hypothyroidism


c/w home medications





6) Hyponatremia


-resolved


- secondary to SIADH caused for medication 


-Nephrology consult appreciated: dc tolvactan





7) DVT prophylaxis


-Lovenox 40 mg sc daily

## 2018-05-10 NOTE — CP.PCM.PN
Subjective





- Date & Time of Evaluation


Date of Evaluation: 05/10/18


Time of Evaluation: 10:19





- Subjective


Subjective: 





Position receiving physiotherapy


Patient appeared to be doing much better


No nausea no vomiting.





Objective





- Vital Signs/Intake and Output


Vital Signs (last 24 hours): 


 











Temp Pulse Resp BP Pulse Ox


 


 97.0 F L  85   19   151/69 H  96 


 


 05/10/18 08:41  05/10/18 08:59  05/10/18 08:41  05/10/18 08:59  05/10/18 08:41








Intake and Output: 


 











 05/10/18 05/10/18





 06:59 18:59


 


Intake Total 150 


 


Balance 150 














- Medications


Medications: 


 Current Medications





Acetaminophen (Tylenol 325mg Tab)  325 mg PO Q6 PRN


   PRN Reason: Pain 1-10


Alprazolam (Xanax)  0.25 mg PO TID PRN


   PRN Reason: Anxiety


   Stop: 05/15/18 18:10


   Last Admin: 05/09/18 21:16 Dose:  0.25 mg


Aspirin (Aspirin Chewable)  81 mg PO DAILY [facility]


   Last Admin: 05/10/18 08:58 Dose:  81 mg


Atorvastatin Calcium (Lipitor)  40 mg PO HS [facility]


   Last Admin: 05/09/18 21:16 Dose:  40 mg


Clotrimazole (Lotrimin 1% Cream)  1 applic TOP 0600,1800 [facility]


   Last Admin: 05/10/18 06:19 Dose:  1 applic


Enoxaparin Sodium (Lovenox)  40 mg SC DAILY [facility]


   PRN Reason: Protocol


   Last Admin: 05/10/18 08:57 Dose:  40 mg


Hydralazine HCl (Apresoline)  50 mg PO Q8 [facility]


   Last Admin: 05/10/18 05:55 Dose:  50 mg


Insulin Human Lispro (Humalog)  0 units SC ACHS [facility]


   PRN Reason: Protocol


   Last Admin: 05/10/18 06:38 Dose:  Not Given


Lactulose (Enulose)  10 gm PO DAILY [facility]N


   PRN Reason: Constipation


Levothyroxine Sodium (Synthroid)  25 mcg PO 0630 [facility]


   Last Admin: 05/10/18 05:55 Dose:  25 mcg


Losartan Potassium (Cozaar)  50 mg PO DAILY [facility]


   Last Admin: 05/10/18 08:59 Dose:  50 mg


Metformin HCl (Glucophage)  1,000 mg PO BIDWM [facility]


   Last Admin: 05/10/18 08:57 Dose:  1,000 mg


Metoprolol Tartrate (Lopressor)  50 mg PO Q12 [facility]


   Last Admin: 05/10/18 08:59 Dose:  50 mg


Nifedipine (Procardia Xl)  30 mg PO DAILY [facility]


   Last Admin: 05/10/18 08:58 Dose:  30 mg


Pantoprazole Sodium (Protonix Ec Tab)  40 mg PO 0630 [facility]


   Last Admin: 05/10/18 05:55 Dose:  40 mg


Senna/Docusate Sodium (Senokot S 50 Mg-8.6 Mg)  2 tab PO Q12 [facility]


   Last Admin: 05/10/18 08:57 Dose:  2 tab


Sitagliptin Phosphate (Januvia)  100 mg PO DAILY [facility]


   Last Admin: 05/10/18 08:57 Dose:  100 mg











- Labs


Labs: 


 





 05/09/18 05:40 





 05/10/18 05:40 











- Constitutional


Appears: No Acute Distress





- ENT Exam


ENT Exam: Mucous Membranes Moist





- Neck Exam


Neck Exam: absent: Lymphadenopathy





- Respiratory Exam


Respiratory Exam: NORMAL BREATHING PATTERN.  absent: Chest Wall Tenderness





- Cardiovascular Exam


Cardiovascular Exam: REGULAR RHYTHM.  absent: JVD, Rubs





- GI/Abdominal Exam


GI & Abdominal Exam: Soft, Normal Bowel Sounds





- Extremities Exam


Extremities Exam: absent: Calf Tenderness





- Back Exam


Back Exam: absent: CVA tenderness (L), CVA tenderness (R)





- Neurological Exam


Neurological Exam: Alert





- Skin


Skin Exam: absent: Cyanosis





Assessment and Plan


(1) Hyponatremia


Assessment & Plan: 


Serum sodium 137 appeared to be corrected.


No need for Samsca.


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


Diabetes mellitus


Hypertension


Status: Acute

## 2018-05-11 LAB
ALBUMIN SERPL-MCNC: 3.5 G/DL (ref 3.5–5)
ALBUMIN/GLOB SERPL: 1.2 {RATIO} (ref 1–2.1)
ALT SERPL-CCNC: 44 U/L (ref 9–52)
AST SERPL-CCNC: 28 U/L (ref 14–36)
BUN SERPL-MCNC: 27 MG/DL (ref 7–17)
CALCIUM SERPL-MCNC: 9.3 MG/DL (ref 8.4–10.2)
GFR NON-AFRICAN AMERICAN: 53

## 2018-05-11 RX ADMIN — ENOXAPARIN SODIUM SCH MG: 40 INJECTION SUBCUTANEOUS at 09:19

## 2018-05-11 RX ADMIN — STANDARDIZED SENNA CONCENTRATE AND DOCUSATE SODIUM SCH TAB: 8.6; 5 TABLET, FILM COATED ORAL at 21:57

## 2018-05-11 RX ADMIN — PANTOPRAZOLE SODIUM SCH MG: 40 TABLET, DELAYED RELEASE ORAL at 06:25

## 2018-05-11 RX ADMIN — NIFEDIPINE SCH MG: 30 TABLET, FILM COATED, EXTENDED RELEASE ORAL at 09:19

## 2018-05-11 RX ADMIN — INSULIN LISPRO SCH: 100 INJECTION, SOLUTION INTRAVENOUS; SUBCUTANEOUS at 11:30

## 2018-05-11 RX ADMIN — INSULIN LISPRO SCH: 100 INJECTION, SOLUTION INTRAVENOUS; SUBCUTANEOUS at 16:37

## 2018-05-11 RX ADMIN — INSULIN LISPRO SCH: 100 INJECTION, SOLUTION INTRAVENOUS; SUBCUTANEOUS at 06:40

## 2018-05-11 RX ADMIN — STANDARDIZED SENNA CONCENTRATE AND DOCUSATE SODIUM SCH: 8.6; 5 TABLET, FILM COATED ORAL at 09:20

## 2018-05-11 RX ADMIN — INSULIN LISPRO SCH: 100 INJECTION, SOLUTION INTRAVENOUS; SUBCUTANEOUS at 21:53

## 2018-05-11 NOTE — CP.PCM.PN
Subjective





- Date & Time of Evaluation


Date of Evaluation: 05/11/18


Time of Evaluation: 10:00





- Subjective


Subjective: 





no acute pain at present





Objective





- Vital Signs/Intake and Output


Vital Signs (last 24 hours): 


 











Temp Pulse Resp BP Pulse Ox


 


 97.0 F L  70   22   120/62   98 


 


 05/11/18 08:59  05/11/18 09:19  05/11/18 08:59  05/11/18 09:19  05/11/18 08:59











- Medications


Medications: 


 Current Medications





Acetaminophen (Tylenol 325mg Tab)  325 mg PO Q6 PRN


   PRN Reason: Pain 1-10


Alprazolam (Xanax)  0.25 mg PO TID PRN


   PRN Reason: Anxiety


   Stop: 05/15/18 18:10


   Last Admin: 05/10/18 22:43 Dose:  0.25 mg


Aspirin (Aspirin Chewable)  81 mg PO DAILY [facility]


   Last Admin: 05/11/18 09:17 Dose:  81 mg


Atorvastatin Calcium (Lipitor)  40 mg PO HS [facility]


   Last Admin: 05/10/18 21:49 Dose:  40 mg


Clotrimazole (Lotrimin 1% Cream)  1 applic TOP 0600,1800 [facility]


   Last Admin: 05/11/18 06:25 Dose:  1 applic


Enoxaparin Sodium (Lovenox)  40 mg SC DAILY [facility]


   PRN Reason: Protocol


   Last Admin: 05/11/18 09:19 Dose:  40 mg


Hydralazine HCl (Apresoline)  50 mg PO Q8 [facility]


   Last Admin: 05/11/18 06:26 Dose:  50 mg


Insulin Human Lispro (Humalog)  0 units SC ACHS [facility]


   PRN Reason: Protocol


   Last Admin: 05/11/18 06:40 Dose:  Not Given


Lactulose (Enulose)  10 gm PO DAILY [facility]N


   PRN Reason: Constipation


Levothyroxine Sodium (Synthroid)  25 mcg PO 0630 [facility]


   Last Admin: 05/11/18 06:26 Dose:  25 mcg


Losartan Potassium (Cozaar)  50 mg PO DAILY [facility]


   Last Admin: 05/11/18 09:17 Dose:  50 mg


Metformin HCl (Glucophage)  1,000 mg PO BIDWM [facility]


   Last Admin: 05/11/18 09:16 Dose:  1,000 mg


Metoprolol Tartrate (Lopressor)  100 mg PO Q12 [facility]


   Last Admin: 05/11/18 09:18 Dose:  100 mg


Nifedipine (Procardia Xl)  30 mg PO DAILY [facility]


   Last Admin: 05/11/18 09:19 Dose:  30 mg


Pantoprazole Sodium (Protonix Ec Tab)  40 mg PO 0630 [facility]


   Last Admin: 05/11/18 06:25 Dose:  40 mg


Senna/Docusate Sodium (Senokot S 50 Mg-8.6 Mg)  2 tab PO Q12 [facility]


   Last Admin: 05/11/18 09:20 Dose:  Not Given


Sitagliptin Phosphate (Januvia)  100 mg PO DAILY [facility]


   Last Admin: 05/11/18 09:17 Dose:  100 mg











- Labs


Labs: 


 





 05/09/18 05:40 





 05/11/18 09:00 











- Head Exam


Head Exam: ATRAUMATIC, NORMAL INSPECTION, NORMOCEPHALIC





- Eye Exam


Eye Exam: EOMI, Normal appearance, PERRL


Pupil Exam: NORMAL ACCOMODATION





- ENT Exam


ENT Exam: Mucous Membranes Moist, Normal Exam





- Neck Exam


Neck Exam: Normal Inspection





- Respiratory Exam


Respiratory Exam: Clear to Ausculation Bilateral, NORMAL BREATHING PATTERN





- Cardiovascular Exam


Cardiovascular Exam: REGULAR RHYTHM





- GI/Abdominal Exam


GI & Abdominal Exam: Soft, Normal Bowel Sounds





- Rectal Exam


Rectal Exam: NORMAL INSPECTION





-  Exam


External exam: NORMAL EXTERNAL EXAM





- Extremities Exam


Extremities Exam: Full ROM, Normal Capillary Refill, Normal Inspection





- Back Exam


Back Exam: NORMAL INSPECTION





- Neurological Exam


Neurological Exam: Alert, Awake


Neuro motor strength exam: Left Upper Extremity: 2/1, Right Upper Extremity: 3, 

Left Lower Extremity: 2/1, Right Lower Extremity: 3





- Psychiatric Exam


Psychiatric exam: Normal Affect, Normal Mood





- Skin


Skin Exam: Dry, Intact





Assessment and Plan


(1) CVA (cerebral vascular accident)


Assessment & Plan: 


physical, occupational, rec therapy 


Status: Acute   





(2) Hyponatremia


Status: Acute   





(3) Anxiety


Status: Acute   





(4) Benign hypertension


Status: Acute   





(5) HTN (hypertension)


Status: Acute

## 2018-05-11 NOTE — RAD
PROCEDURE:  Right Knee Radiographs. 



HISTORY:

Knee pain



COMPARISON:

None.



FINDINGS:



BONES:

No evidence of acute displaced fracture nor dislocation. The osseous 

structures appear intact. 



JOINTS:

Mild degenerative osteoarthritis most notably affecting the 

patellofemoral compartment. .  Small anterior superior patella 

enthesophyte present. There is also small osteophyte arising from the 

medial margins of the distal femur and tibial plateau 



JOINT EFFUSION:

Trace suprapatellar joint effusion 



OTHER FINDINGS:

None.



IMPRESSION:

No evidence of acute displaced fracture nor dislocation. 



Mild DJD as detailed above.  Suspect trace joint effusion.

## 2018-05-11 NOTE — CP.PCM.PN
Subjective





- Date & Time of Evaluation


Date of Evaluation: 05/11/18


Time of Evaluation: 07:30





- Subjective


Subjective: 





Patient seen and examined bedside with Dr Hernandez. Patient  doing better with 

Physical therapy. dines chest pain, SOB,f,n,v,d. tolerating diet.no overnight 

events. 


PT advise CR right knee due to point of TD medial side


-f/u XR right knee








Objective





- Vital Signs/Intake and Output


Vital Signs (last 24 hours): 


 











Temp Pulse Resp BP Pulse Ox


 


 97.0 F L  70   22   120/62   98 


 


 05/11/18 08:59  05/11/18 09:19  05/11/18 08:59  05/11/18 09:19  05/11/18 08:59











- Medications


Medications: 


 Current Medications





Acetaminophen (Tylenol 325mg Tab)  325 mg PO Q6 PRN


   PRN Reason: Pain 1-10


Alprazolam (Xanax)  0.25 mg PO TID PRN


   PRN Reason: Anxiety


   Stop: 05/15/18 18:10


   Last Admin: 05/10/18 22:43 Dose:  0.25 mg


Aspirin (Aspirin Chewable)  81 mg PO DAILY [facility]


   Last Admin: 05/11/18 09:17 Dose:  81 mg


Atorvastatin Calcium (Lipitor)  40 mg PO HS [facility]


   Last Admin: 05/10/18 21:49 Dose:  40 mg


Clotrimazole (Lotrimin 1% Cream)  1 applic TOP 0600,1800 [facility]


   Last Admin: 05/11/18 06:25 Dose:  1 applic


Enoxaparin Sodium (Lovenox)  40 mg SC DAILY [facility]


   PRN Reason: Protocol


   Last Admin: 05/11/18 09:19 Dose:  40 mg


Hydralazine HCl (Apresoline)  50 mg PO Q8 [facility]


   Last Admin: 05/11/18 06:26 Dose:  50 mg


Insulin Human Lispro (Humalog)  0 units SC ACHS [facility]


   PRN Reason: Protocol


   Last Admin: 05/11/18 06:40 Dose:  Not Given


Lactulose (Enulose)  10 gm PO DAILY PRN


   PRN Reason: Constipation


Levothyroxine Sodium (Synthroid)  25 mcg PO 0630 [facility]


   Last Admin: 05/11/18 06:26 Dose:  25 mcg


Losartan Potassium (Cozaar)  50 mg PO DAILY [facility]


   Last Admin: 05/11/18 09:17 Dose:  50 mg


Metformin HCl (Glucophage)  1,000 mg PO BIDWM [facility]


   Last Admin: 05/11/18 09:16 Dose:  1,000 mg


Metoprolol Tartrate (Lopressor)  100 mg PO Q12 [facility]


   Last Admin: 05/11/18 09:18 Dose:  100 mg


Nifedipine (Procardia Xl)  30 mg PO DAILY [facility]


   Last Admin: 05/11/18 09:19 Dose:  30 mg


Pantoprazole Sodium (Protonix Ec Tab)  40 mg PO 0630 [facility]


   Last Admin: 05/11/18 06:25 Dose:  40 mg


Senna/Docusate Sodium (Senokot S 50 Mg-8.6 Mg)  2 tab PO Q12 [facility]


   Last Admin: 05/11/18 09:20 Dose:  Not Given


Sitagliptin Phosphate (Januvia)  100 mg PO DAILY [facility]


   Last Admin: 05/11/18 09:17 Dose:  100 mg











- Labs


Labs: 


 





 05/09/18 05:40 





 05/10/18 05:40 











- Constitutional


Appears: Non-toxic, No Acute Distress





- Head Exam


Head Exam: ATRAUMATIC, NORMOCEPHALIC





- Eye Exam


Eye Exam: Normal appearance





- ENT Exam


ENT Exam: Mucous Membranes Moist





- Respiratory Exam


Respiratory Exam: Clear to Ausculation Bilateral.  absent: Rales, Rhonchi, 

Wheezes





- Cardiovascular Exam


Cardiovascular Exam: REGULAR RHYTHM, +S1, +S2





- GI/Abdominal Exam


GI & Abdominal Exam: Soft, Normal Bowel Sounds.  absent: Tenderness





- Extremities Exam


Extremities Exam: Normal Inspection.  absent: Pedal Edema





- Neurological Exam


Neurological Exam: Alert, Awake


Neuro motor strength exam: Left Upper Extremity: 5, Right Upper Extremity: 3, 

Left Lower Extremity: 5, Right Lower Extremity: 3


Additional comments: 





right residual hemiparesis





- Psychiatric Exam


Psychiatric exam: Normal Mood





- Skin


Skin Exam: Intact





Assessment and Plan





- Assessment and Plan (Free Text)


Plan: 





Assessment/Plan 





1) CVA


-chronic with  residual right hemiparesis


-record on paper chart: MRI brain left pontine infarct. 2 right sphenoid 

meningiomas. 


-Pt/OT


-speech therapy 





2) DM


hga1c 7.3


-Controlled for her age


-metformin 1000 BID


-Januvia 100 mg daily 





3) HTN


-c/w losartan,


Metoprolol Tartrate increased to 100 mg BID





4) HLD


c/w Lipitor 40 mg daily 








5) Hypothyroidism


c/w home medications





6) Hyponatremia


-Na 131


- secondary to SIADH caused for medication 


-Nephrology consult appreciated: edgar tolvactan





7) DVT prophylaxis


-Lovenox 40 mg sc daily

## 2018-05-12 LAB
ERYTHROCYTE [DISTWIDTH] IN BLOOD BY AUTOMATED COUNT: 14.1 % (ref 11.5–14.5)
HGB BLD-MCNC: 11.3 G/DL (ref 12–16)
MCH RBC QN AUTO: 31.2 PG (ref 27–31)
MCHC RBC AUTO-ENTMCNC: 34.2 G/DL (ref 33–37)
MCV RBC AUTO: 91.2 FL (ref 81–99)
PLATELET # BLD: 386 K/UL (ref 130–400)
RBC # BLD AUTO: 3.61 MIL/UL (ref 3.8–5.2)
WBC # BLD AUTO: 8.3 K/UL (ref 4.8–10.8)

## 2018-05-12 RX ADMIN — ENOXAPARIN SODIUM SCH MG: 40 INJECTION SUBCUTANEOUS at 08:49

## 2018-05-12 RX ADMIN — PANTOPRAZOLE SODIUM SCH MG: 40 TABLET, DELAYED RELEASE ORAL at 06:38

## 2018-05-12 RX ADMIN — NIFEDIPINE SCH MG: 30 TABLET, FILM COATED, EXTENDED RELEASE ORAL at 08:50

## 2018-05-12 RX ADMIN — STANDARDIZED SENNA CONCENTRATE AND DOCUSATE SODIUM SCH: 8.6; 5 TABLET, FILM COATED ORAL at 08:52

## 2018-05-12 RX ADMIN — INSULIN LISPRO SCH: 100 INJECTION, SOLUTION INTRAVENOUS; SUBCUTANEOUS at 11:30

## 2018-05-12 RX ADMIN — INSULIN LISPRO SCH: 100 INJECTION, SOLUTION INTRAVENOUS; SUBCUTANEOUS at 21:30

## 2018-05-12 RX ADMIN — INSULIN LISPRO SCH: 100 INJECTION, SOLUTION INTRAVENOUS; SUBCUTANEOUS at 16:30

## 2018-05-12 RX ADMIN — INSULIN LISPRO SCH: 100 INJECTION, SOLUTION INTRAVENOUS; SUBCUTANEOUS at 06:48

## 2018-05-12 NOTE — CP.PCM.PN
Subjective





- Date & Time of Evaluation


Date of Evaluation: 05/12/18


Time of Evaluation: 11:30





- Subjective


Subjective: 





patient lying in bed feeling fine





Objective





- Vital Signs/Intake and Output


Vital Signs (last 24 hours): 


 











Temp Pulse Resp BP Pulse Ox


 


 98.4 F   83   20   140/80   98 


 


 05/12/18 08:50  05/12/18 14:00  05/12/18 08:50  05/12/18 14:00  05/12/18 08:50








Intake and Output: 


 











 05/12/18 05/13/18





 18:59 06:59


 


Intake Total 300 


 


Output Total 1 


 


Balance 299 














- Medications


Medications: 


 Current Medications





Acetaminophen (Tylenol 325mg Tab)  325 mg PO Q6 PRN


   PRN Reason: Pain 1-10


Alprazolam (Xanax)  0.25 mg PO TID PRN


   PRN Reason: Anxiety


   Stop: 05/15/18 18:10


   Last Admin: 05/11/18 22:04 Dose:  0.25 mg


Aspirin (Aspirin Chewable)  81 mg PO DAILY [facility]


   Last Admin: 05/12/18 08:50 Dose:  81 mg


Atorvastatin Calcium (Lipitor)  40 mg PO HS [facility]


   Last Admin: 05/11/18 21:53 Dose:  40 mg


Clotrimazole (Lotrimin 1% Cream)  1 applic TOP 0600,1800 [facility]


   Last Admin: 05/12/18 18:17 Dose:  1 applic


Enoxaparin Sodium (Lovenox)  40 mg SC DAILY [facility]


   PRN Reason: Protocol


   Last Admin: 05/12/18 08:49 Dose:  40 mg


Hydralazine HCl (Apresoline)  50 mg PO Q8 [facility]


   Last Admin: 05/12/18 14:00 Dose:  50 mg


Insulin Human Lispro (Humalog)  0 units SC ACHS [facility]


   PRN Reason: Protocol


   Last Admin: 05/12/18 16:30 Dose:  Not Given


Lactulose (Enulose)  10 gm PO DAILY [facility]N


   PRN Reason: Constipation


Levothyroxine Sodium (Synthroid)  25 mcg PO 0630 [facility]


   Last Admin: 05/12/18 06:39 Dose:  25 mcg


Losartan Potassium (Cozaar)  50 mg PO DAILY [facility]


   Last Admin: 05/12/18 08:49 Dose:  50 mg


Metformin HCl (Glucophage)  1,000 mg PO BIDWM [facility]


   Last Admin: 05/12/18 18:16 Dose:  1,000 mg


Metoprolol Tartrate (Lopressor)  100 mg PO Q12 [facility]


   Last Admin: 05/12/18 08:51 Dose:  100 mg


Nifedipine (Procardia Xl)  30 mg PO DAILY [facility]


   Last Admin: 05/12/18 08:50 Dose:  30 mg


Pantoprazole Sodium (Protonix Ec Tab)  40 mg PO 0630 [facility]


   Last Admin: 05/12/18 06:38 Dose:  40 mg


Sitagliptin Phosphate (Januvia)  100 mg PO DAILY [facility]


   Last Admin: 05/12/18 10:00 Dose:  100 mg











- Labs


Labs: 


 





 05/12/18 04:00 





 05/11/18 09:00 











- Head Exam


Head Exam: ATRAUMATIC, NORMAL INSPECTION, NORMOCEPHALIC





- Eye Exam


Eye Exam: EOMI, Normal appearance


Pupil Exam: NORMAL ACCOMODATION, PERRL





- ENT Exam


ENT Exam: Mucous Membranes Moist, Normal Exam





- Neck Exam


Neck Exam: Full ROM, Normal Inspection





- Respiratory Exam


Respiratory Exam: Clear to Ausculation Bilateral, NORMAL BREATHING PATTERN





- Cardiovascular Exam


Cardiovascular Exam: REGULAR RHYTHM





- GI/Abdominal Exam


GI & Abdominal Exam: Soft, Normal Bowel Sounds





- Rectal Exam


Rectal Exam: NORMAL INSPECTION





-  Exam


External exam: NORMAL EXTERNAL EXAM





- Back Exam


Back Exam: NORMAL INSPECTION





- Neurological Exam


Neurological Exam: Alert, Awake


Neuro motor strength exam: Left Upper Extremity: 3, Right Upper Extremity: 2/1, 

Left Lower Extremity: 3, Right Lower Extremity: 2/1





- Psychiatric Exam


Psychiatric exam: Normal Affect





Assessment and Plan


(1) CVA (cerebral vascular accident)


Assessment & Plan: 


plan for physical, occupational, rec and speech therapy program


Status: Acute   





(2) Hyponatremia


Status: Acute   





(3) Anxiety


Status: Acute   





(4) Benign hypertension


Status: Acute   





(5) HTN (hypertension)


Status: Acute

## 2018-05-12 NOTE — CP.PCM.PN
Subjective





- Date & Time of Evaluation


Date of Evaluation: 05/12/18


Time of Evaluation: 17:23





- Subjective


Subjective: 


Follow up Nephrology Consultation Note





Assessment: Stable


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


DM, HTN, hx of breast CA





Plan


BP controlled on meds as ordered


will repeat serum Na tomorrow as labs showed it was decreasing though still >130


avoid correction in serum Na >6-8 meq/24 hrs


had required samsca recently, off the med for now as Na was 137


Glycemic control


Further work up for as per primary team





Thanks for allowing me to participate in care of your patient. Will follow 

patient with you. Please call if any Qs


Dr Erasmo Tipton


Office: [phone] Cell: [phone] Fax: [phone]





Subjective: Noted events overnight. Patients feels okay. 


Denies chest pain, palpitation, shortness of breath, leg swelling. 


All other negative





Physical Examination: 


General Appearance: Comfortable, in no acute respiratory distress, co-operative 

. 


Vitals reviewed and noted as below


Head; Atraumatic, normocephalic


ENT: no ulcers no thrush. Tongue is midline. Oropharynx: no rash or ulcers.


EYES: Pupils are equal, round and reactive to light accommodation. Eye muscles 

and extraocular movement intact. Sclera is anicteric.


Neck; supple no lymphadenopathy, no thyromegaly or bruit


Lungs: Normal respiratory rate/effort. Breath sounds bilateral equal and clear


Heart: Normal rate. s1s2 normal. No rub or gallop. 


Extremities: no edema. No varicose veins


Neurological: Patient is alert, awake and oriented to person, place and time. 

has rt side weakness and slurred speech


Skin: Warm and dry. Normal turgor. No rash. Palpitation: Normal elasticity for 

age


Abdomen: Abdomen is soft. Bowel sounds +. There is no abdominal tenderness, no 

guarding/rigidity no organomegaly


Psych: normal insight and normal affect/mood


MSK: no joint tenderness or swelling. Digits and nails normal, no deformity


: kidney or bladder not palpable





Labs/imaging reviewed.


Past medical history, past surgical history, family history, social history, 

allergy reviewed and noted as below


Family hx: no hx of CKD. Rest non-contributory 











Objective





- Vital Signs/Intake and Output


Vital Signs (last 24 hours): 


 











Temp Pulse Resp BP Pulse Ox


 


 98.4 F   64   20   126/69   98 


 


 05/12/18 08:50  05/12/18 08:51  05/12/18 08:50  05/12/18 08:51  05/12/18 08:50








Intake and Output: 


 











 05/12/18 05/12/18





 06:59 18:59


 


Intake Total 150 


 


Balance 150 














- Medications


Medications: 


 Current Medications





Acetaminophen (Tylenol 325mg Tab)  325 mg PO Q6 PRN


   PRN Reason: Pain 1-10


Alprazolam (Xanax)  0.25 mg PO TID PRN


   PRN Reason: Anxiety


   Stop: 05/15/18 18:10


   Last Admin: 05/11/18 22:04 Dose:  0.25 mg


Aspirin (Aspirin Chewable)  81 mg PO DAILY [facility]


   Last Admin: 05/12/18 08:50 Dose:  81 mg


Atorvastatin Calcium (Lipitor)  40 mg PO HS [facility]


   Last Admin: 05/11/18 21:53 Dose:  40 mg


Clotrimazole (Lotrimin 1% Cream)  1 applic TOP 0600,1800 [facility]


   Last Admin: 05/12/18 06:39 Dose:  1 applic


Enoxaparin Sodium (Lovenox)  40 mg SC DAILY [facility]


   PRN Reason: Protocol


   Last Admin: 05/12/18 08:49 Dose:  40 mg


Hydralazine HCl (Apresoline)  50 mg PO Q8 [facility]


   Last Admin: 05/12/18 06:39 Dose:  50 mg


Insulin Human Lispro (Humalog)  0 units SC ACHS [facility]


   PRN Reason: Protocol


   Last Admin: 05/12/18 11:30 Dose:  Not Given


Lactulose (Enulose)  10 gm PO DAILY [facility]


   PRN Reason: Constipation


Levothyroxine Sodium (Synthroid)  25 mcg PO 0630 [facility]


   Last Admin: 05/12/18 06:39 Dose:  25 mcg


Losartan Potassium (Cozaar)  50 mg PO DAILY [facility]


   Last Admin: 05/12/18 08:49 Dose:  50 mg


Metformin HCl (Glucophage)  1,000 mg PO BIDWM [facility]


   Last Admin: 05/12/18 08:50 Dose:  1,000 mg


Metoprolol Tartrate (Lopressor)  100 mg PO Q12 [facility]


   Last Admin: 05/12/18 08:51 Dose:  100 mg


Nifedipine (Procardia Xl)  30 mg PO DAILY [facility]


   Last Admin: 05/12/18 08:50 Dose:  30 mg


Pantoprazole Sodium (Protonix Ec Tab)  40 mg PO 0630 [facility]


   Last Admin: 05/12/18 06:38 Dose:  40 mg


Senna/Docusate Sodium (Senokot S 50 Mg-8.6 Mg)  2 tab PO Q12 [facility]


   Last Admin: 05/12/18 08:52 Dose:  Not Given


Sitagliptin Phosphate (Januvia)  100 mg PO DAILY [facility]


   Last Admin: 05/12/18 10:00 Dose:  100 mg











- Labs


Labs: 


 





 05/12/18 04:00 





 05/11/18 09:00

## 2018-05-13 LAB
BUN SERPL-MCNC: 22 MG/DL (ref 7–17)
CALCIUM SERPL-MCNC: 8.6 MG/DL (ref 8.4–10.2)
GFR NON-AFRICAN AMERICAN: > 60

## 2018-05-13 RX ADMIN — ENOXAPARIN SODIUM SCH MG: 40 INJECTION SUBCUTANEOUS at 08:59

## 2018-05-13 RX ADMIN — NIFEDIPINE SCH MG: 30 TABLET, FILM COATED, EXTENDED RELEASE ORAL at 09:08

## 2018-05-13 RX ADMIN — INSULIN LISPRO SCH: 100 INJECTION, SOLUTION INTRAVENOUS; SUBCUTANEOUS at 21:14

## 2018-05-13 RX ADMIN — INSULIN LISPRO SCH: 100 INJECTION, SOLUTION INTRAVENOUS; SUBCUTANEOUS at 16:30

## 2018-05-13 RX ADMIN — INSULIN LISPRO SCH: 100 INJECTION, SOLUTION INTRAVENOUS; SUBCUTANEOUS at 06:54

## 2018-05-13 RX ADMIN — INSULIN LISPRO SCH: 100 INJECTION, SOLUTION INTRAVENOUS; SUBCUTANEOUS at 11:56

## 2018-05-13 RX ADMIN — PANTOPRAZOLE SODIUM SCH MG: 40 TABLET, DELAYED RELEASE ORAL at 05:51

## 2018-05-13 RX ADMIN — ALUMINUM HYDROXIDE, MAGNESIUM HYDROXIDE, AND SIMETHICONE PRN ML: 200; 200; 20 SUSPENSION ORAL at 15:16

## 2018-05-13 NOTE — PN
DATE:  05/13/2018



SUBJECTIVE:  The patient is seen and examined.  The patient is seen for Dr. Hernandez while he is away.  The patient is awake and responsive.  Feels okay.

Denies any specific complaints.  No chest pain.  No shortness of breath. 

Continues with persistent pain _____.



PHYSICAL EXAMINATION:

GENERAL:  The patient is in no acute distress.

VITAL SIGNS:  Stable.

HEART:  S1 and S2, normal and regular.

LUNGS:  Good bilateral air exchange.

ABDOMEN:  Soft and nontender.

EXTREMITIES:  No edema.  No calf swelling.  No tenderness.  No acute

ischemia.

CENTRAL NERVOUS SYSTEM:  Essentially unchanged.



DIAGNOSTIC DATA:  Available diagnostic data reviewed.



PLAN:  Overall, the patient's general medical condition is stable.  Plan as

ordered.





__________________________________________

Enrique Kelly MD





DD:  05/13/2018 6:41:51

DT:  05/13/2018 8:08:46

Job # [id number]

## 2018-05-13 NOTE — CP.PCM.PN
Subjective





- Date & Time of Evaluation


Date of Evaluation: 05/13/18


Time of Evaluation: 15:12





- Subjective


Subjective: 





Follow up Nephrology Consultation Note





Assessment: Stable


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


DM, HTN, hx of breast CA





Plan


BP controlled on meds as ordered


will repeat serum Na tomorrow as labs showed it was decreasing Na 128. if Na 

continues to trend low, consider to resume samsca 15 mg/day


avoid correction in serum Na >6-8 meq/24 hrs


had required samsca recently, off the med as Na was 137


Glycemic control


Further work up for as per primary team





Thanks for allowing me to participate in care of your patient. Will follow 

patient with you. Please call if any Qs


Dr Erasmo Tipton


Office: [phone] Cell: [phone] Fax: [phone]





Subjective: Noted events overnight. Patients feels okay. 


Denies chest pain, palpitation, shortness of breath, leg swelling. 


All other negative





Physical Examination: 


General Appearance: Comfortable, in no acute respiratory distress, co-operative 

. 


Vitals reviewed and noted as below


Head; Atraumatic, normocephalic


ENT: no ulcers no thrush. Tongue is midline. Oropharynx: no rash or ulcers.


EYES: Pupils are equal, round and reactive to light accommodation. Eye muscles 

and extraocular movement intact. Sclera is anicteric.


Neck; supple no lymphadenopathy, no thyromegaly or bruit


Lungs: Normal respiratory rate/effort. Breath sounds bilateral equal and clear


Heart: Normal rate. s1s2 normal. No rub or gallop. 


Extremities: no edema. No varicose veins


Neurological: Patient is alert, awake and oriented to person, place and time. 

has rt side weakness and slurred speech


Skin: Warm and dry. Normal turgor. No rash. Palpitation: Normal elasticity for 

age


Abdomen: Abdomen is soft. Bowel sounds +. There is no abdominal tenderness, no 

guarding/rigidity no organomegaly


Psych: normal insight and normal affect/mood


MSK: no joint tenderness or swelling. Digits and nails normal, no deformity


: kidney or bladder not palpable





Labs/imaging reviewed.


Past medical history, past surgical history, family history, social history, 

allergy reviewed and noted as below


Family hx: no hx of CKD. Rest non-contributory 











Objective





- Vital Signs/Intake and Output


Vital Signs (last 24 hours): 


 











Temp Pulse Resp BP Pulse Ox


 


 97.0 F L  61   19   119/63   97 


 


 05/13/18 08:30  05/13/18 09:00  05/13/18 08:30  05/13/18 09:08  05/13/18 08:30








Intake and Output: 


 











 05/13/18 05/13/18





 06:59 18:59


 


Intake Total 350 


 


Balance 350 














- Medications


Medications: 


 Current Medications





Acetaminophen (Tylenol 325mg Tab)  325 mg PO Q6 PRN


   PRN Reason: Pain 1-10


Al Hydrox/Mg Hydrox/Simethicone (Maalox Plus 30 Ml)  30 ml PO Q6 PRN


   PRN Reason: Indigestion / Heartburn


Alprazolam (Xanax)  0.25 mg PO TID PRN


   PRN Reason: Anxiety


   Stop: 05/15/18 18:10


   Last Admin: 05/13/18 00:16 Dose:  0.25 mg


Aspirin (Aspirin Chewable)  81 mg PO DAILY [facility]


   Last Admin: 05/13/18 08:57 Dose:  81 mg


Atorvastatin Calcium (Lipitor)  40 mg PO HS [facility]


   Last Admin: 05/12/18 21:09 Dose:  40 mg


Clotrimazole (Lotrimin 1% Cream)  1 applic TOP 0600,1800 [facility]


   Last Admin: 05/13/18 05:52 Dose:  1 applic


Enoxaparin Sodium (Lovenox)  40 mg SC DAILY [facility]


   PRN Reason: Protocol


   Last Admin: 05/13/18 08:59 Dose:  40 mg


Hydralazine HCl (Apresoline)  50 mg PO Q8 [facility]


   Last Admin: 05/13/18 05:51 Dose:  50 mg


Insulin Human Lispro (Humalog)  0 units SC ACHS [facility]


   PRN Reason: Protocol


   Last Admin: 05/13/18 11:56 Dose:  Not Given


Lactulose (Enulose)  10 gm PO DAILY [facility]


   PRN Reason: Constipation


Levothyroxine Sodium (Synthroid)  25 mcg PO 0630 [facility]


   Last Admin: 05/13/18 05:51 Dose:  25 mcg


Losartan Potassium (Cozaar)  50 mg PO DAILY [facility]


   Last Admin: 05/13/18 08:57 Dose:  50 mg


Metformin HCl (Glucophage)  1,000 mg PO BIDWM [facility]


   Last Admin: 05/13/18 08:57 Dose:  1,000 mg


Metoprolol Tartrate (Lopressor)  100 mg PO Q12 [facility]


   Last Admin: 05/13/18 09:00 Dose:  100 mg


Nifedipine (Procardia Xl)  30 mg PO DAILY [facility]


   Last Admin: 05/13/18 09:08 Dose:  30 mg


Pantoprazole Sodium (Protonix Ec Tab)  40 mg PO 0630 [facility]


   Last Admin: 05/13/18 05:51 Dose:  40 mg


Sitagliptin Phosphate (Januvia)  100 mg PO DAILY [facility]


   Last Admin: 05/13/18 08:57 Dose:  100 mg











- Labs


Labs: 


 





 05/12/18 04:00 





 05/13/18 05:30

## 2018-05-14 LAB
ALBUMIN SERPL-MCNC: 3.2 G/DL (ref 3.5–5)
ALBUMIN/GLOB SERPL: 1.1 {RATIO} (ref 1–2.1)
ALT SERPL-CCNC: 51 U/L (ref 9–52)
AST SERPL-CCNC: 26 U/L (ref 14–36)
BUN SERPL-MCNC: 22 MG/DL (ref 7–17)
CALCIUM SERPL-MCNC: 8.9 MG/DL (ref 8.4–10.2)
ERYTHROCYTE [DISTWIDTH] IN BLOOD BY AUTOMATED COUNT: 13.8 % (ref 11.5–14.5)
GFR NON-AFRICAN AMERICAN: > 60
HGB BLD-MCNC: 10.5 G/DL (ref 12–16)
MCH RBC QN AUTO: 31 PG (ref 27–31)
MCHC RBC AUTO-ENTMCNC: 34.4 G/DL (ref 33–37)
MCV RBC AUTO: 90.1 FL (ref 81–99)
OSMOLALITY,URINE: 503 MOSM/KG (ref 300–1000)
PLATELET # BLD: 383 K/UL (ref 130–400)
RBC # BLD AUTO: 3.39 MIL/UL (ref 3.8–5.2)
WBC # BLD AUTO: 6.4 K/UL (ref 4.8–10.8)

## 2018-05-14 RX ADMIN — INSULIN LISPRO SCH: 100 INJECTION, SOLUTION INTRAVENOUS; SUBCUTANEOUS at 12:36

## 2018-05-14 RX ADMIN — INSULIN LISPRO SCH: 100 INJECTION, SOLUTION INTRAVENOUS; SUBCUTANEOUS at 06:44

## 2018-05-14 RX ADMIN — PANTOPRAZOLE SODIUM SCH MG: 40 TABLET, DELAYED RELEASE ORAL at 05:51

## 2018-05-14 RX ADMIN — ENOXAPARIN SODIUM SCH MG: 40 INJECTION SUBCUTANEOUS at 09:29

## 2018-05-14 RX ADMIN — INSULIN LISPRO SCH: 100 INJECTION, SOLUTION INTRAVENOUS; SUBCUTANEOUS at 22:10

## 2018-05-14 RX ADMIN — NIFEDIPINE SCH MG: 30 TABLET, FILM COATED, EXTENDED RELEASE ORAL at 09:30

## 2018-05-14 RX ADMIN — INSULIN LISPRO SCH: 100 INJECTION, SOLUTION INTRAVENOUS; SUBCUTANEOUS at 16:19

## 2018-05-14 NOTE — PN
DATE:  05/14/2018



SUBJECTIVE:  The patient is seen and examined.  Interim events noted. 

Physiatry intervention noted and appreciated.  The patient remains in Acute

Rehab Unit.  The patient complains of not sleeping at night, but according

to nursing and support staffs, the patient slept whole night.  No other

complaints of chest pain.  No shortness of breath.



PHYSICAL EXAMINATION:

GENERAL:  The patient is in no acute distress.

VITAL SIGNS:  Stable.

HEART:  S1 and S2, normal and regular.

LUNGS:  Good bilateral air exchange.

ABDOMEN:  Soft and nontender.

EXTREMITIES:  No edema, no calf swelling.  No tenderness.  No acute

ischemia.

CENTRAL NERVOUS SYSTEM:  Essentially unchanged.



The patient is status post CVA with hemiparesis and dysarthria.



DIAGNOSTIC DATA:  Available diagnostic data reviewed.



PLAN:  Overall, the patient's general medical condition is stable.  Plan as

ordered.





__________________________________________

Enrique Kelly MD





DD:  05/14/2018 7:51:35

DT:  05/14/2018 8:54:36

Job # [id number]

## 2018-05-14 NOTE — CP.PCM.PN
Subjective





- Date & Time of Evaluation


Date of Evaluation: 05/14/18


Time of Evaluation: 09:10





- Subjective


Subjective: 





Follow up Nephrology Consultation Note





Assessment: Stable


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


DM, HTN, hx of breast CA





Plan


BP controlled on meds as ordered


na still trending down was getting samsca daily at OSH.  Will resume today. 

Urine studies ordered as well


avoid correction in serum Na >6-8 meq/24 hrs


Glycemic control


Further work up for as per primary team











Subjective: denies any complaints no n/v. 





Physical Examination: 


General Appearance: Comfortable, in no acute respiratory distress, co-operative 

. 


Vitals reviewed and noted as below


Head; Atraumatic, normocephalic


ENT: no ulcers no thrush. TOropharynx: no rash or ulcers.


EYES: Pupils are equal, round  Sclera is anicteric.


Neck; supple no lymphadenopathy, no thyromegaly or bruit


Lungs: Normal respiratory rate/effort. Breath sounds bilateral equal and clear


Heart: Normal rate. s1s2 normal. No rub or gallop. 


Extremities: no edema. No varicose veins


Neurological: Patient is alert, awake follows commands R sided weakness


Skin: Warm and dry. Normal turgor. No rash. Palpitation: Normal elasticity for 

age


Abdomen: Abdomen is soft. Bowel sounds +. There is no abdominal tenderness, no 

guarding/rigidity no organomegaly


Psych: flat affect


MSK: no joint tenderness or swelling. Digits and nails normal, no deformity


: kidney or bladder not palpable





Labs/imaging reviewed.


Past medical history, past surgical history, family history, social history, 

allergy reviewed and noted as below


Family hx: no hx of CKD. Rest non-contributory 





Objective





- Vital Signs/Intake and Output


Vital Signs (last 24 hours): 


 











Temp Pulse Resp BP Pulse Ox


 


 97.9 F   73   20   149/78   97 


 


 05/13/18 20:42  05/14/18 09:30  05/13/18 20:42  05/14/18 09:30  05/13/18 20:42








Intake and Output: 


 











 05/14/18 05/14/18





 06:59 18:59


 


Intake Total 250 


 


Balance 250 














- Medications


Medications: 


 Current Medications





Acetaminophen (Tylenol 325mg Tab)  325 mg PO Q6 PRN


   PRN Reason: Pain 1-10


Al Hydrox/Mg Hydrox/Simethicone (Maalox Plus 30 Ml)  30 ml PO Q6 PRN


   PRN Reason: Indigestion / Heartburn


   Last Admin: 05/13/18 15:16 Dose:  30 ml


Alprazolam (Xanax)  0.25 mg PO TID PRN


   PRN Reason: Anxiety


   Stop: 05/15/18 18:10


   Last Admin: 05/13/18 23:32 Dose:  0.25 mg


Aspirin (Aspirin Chewable)  81 mg PO DAILY [facility]


   Last Admin: 05/14/18 09:28 Dose:  81 mg


Atorvastatin Calcium (Lipitor)  40 mg PO HS [facility]


   Last Admin: 05/13/18 21:12 Dose:  40 mg


Clotrimazole (Lotrimin 1% Cream)  1 applic TOP 0600,1800 [facility]


   Last Admin: 05/14/18 05:53 Dose:  1 applic


Docusate Sodium (Colace)  100 mg PO [facility]


Enoxaparin Sodium (Lovenox)  40 mg SC DAILY [facility]


   PRN Reason: Protocol


   Last Admin: 05/14/18 09:29 Dose:  40 mg


Hydralazine HCl (Apresoline)  50 mg PO Q8 [facility]


   Last Admin: 05/14/18 05:51 Dose:  50 mg


Insulin Human Lispro (Humalog)  0 units SC ACHS [facility]


   PRN Reason: Protocol


   Last Admin: 05/14/18 06:44 Dose:  Not Given


Lactulose (Enulose)  10 gm PO DAILY [facility]


   PRN Reason: Constipation


Levothyroxine Sodium (Synthroid)  25 mcg PO 0630 [facility]


   Last Admin: 05/14/18 05:51 Dose:  25 mcg


Losartan Potassium (Cozaar)  50 mg PO DAILY [facility]


   Last Admin: 05/14/18 09:28 Dose:  50 mg


Metformin HCl (Glucophage)  1,000 mg PO BIDWM [facility]


   Last Admin: 05/14/18 09:29 Dose:  1,000 mg


Metoprolol Tartrate (Lopressor)  100 mg PO Q12 [facility]


   Last Admin: 05/14/18 09:29 Dose:  100 mg


Nifedipine (Procardia Xl)  30 mg PO DAILY [facility]


   Last Admin: 05/14/18 09:30 Dose:  30 mg


Pantoprazole Sodium (Protonix Ec Tab)  40 mg PO 0630 [facility]


   Last Admin: 05/14/18 05:51 Dose:  40 mg


Sitagliptin Phosphate (Januvia)  100 mg PO DAILY [facility]


   Last Admin: 05/14/18 09:29 Dose:  100 mg











- Labs


Labs: 


 





 05/14/18 05:30 





 05/14/18 05:30

## 2018-05-15 LAB
ALBUMIN SERPL-MCNC: 3.5 G/DL (ref 3.5–5)
ALBUMIN/GLOB SERPL: 1.2 {RATIO} (ref 1–2.1)
ALT SERPL-CCNC: 39 U/L (ref 9–52)
AST SERPL-CCNC: 25 U/L (ref 14–36)
BUN SERPL-MCNC: 24 MG/DL (ref 7–17)
CALCIUM SERPL-MCNC: 9.4 MG/DL (ref 8.4–10.2)
ERYTHROCYTE [DISTWIDTH] IN BLOOD BY AUTOMATED COUNT: 14.2 % (ref 11.5–14.5)
GFR NON-AFRICAN AMERICAN: > 60
HGB BLD-MCNC: 11.3 G/DL (ref 12–16)
MCH RBC QN AUTO: 31.8 PG (ref 27–31)
MCHC RBC AUTO-ENTMCNC: 35.1 G/DL (ref 33–37)
MCV RBC AUTO: 90.7 FL (ref 81–99)
PLATELET # BLD: 371 K/UL (ref 130–400)
RBC # BLD AUTO: 3.55 MIL/UL (ref 3.8–5.2)
WBC # BLD AUTO: 6.2 K/UL (ref 4.8–10.8)

## 2018-05-15 RX ADMIN — ENOXAPARIN SODIUM SCH MG: 40 INJECTION SUBCUTANEOUS at 12:30

## 2018-05-15 RX ADMIN — INSULIN LISPRO SCH: 100 INJECTION, SOLUTION INTRAVENOUS; SUBCUTANEOUS at 21:55

## 2018-05-15 RX ADMIN — INSULIN LISPRO SCH U: 100 INJECTION, SOLUTION INTRAVENOUS; SUBCUTANEOUS at 12:29

## 2018-05-15 RX ADMIN — INSULIN LISPRO SCH: 100 INJECTION, SOLUTION INTRAVENOUS; SUBCUTANEOUS at 17:27

## 2018-05-15 RX ADMIN — INSULIN LISPRO SCH: 100 INJECTION, SOLUTION INTRAVENOUS; SUBCUTANEOUS at 06:59

## 2018-05-15 RX ADMIN — NIFEDIPINE SCH MG: 30 TABLET, FILM COATED, EXTENDED RELEASE ORAL at 08:55

## 2018-05-15 RX ADMIN — PANTOPRAZOLE SODIUM SCH MG: 40 TABLET, DELAYED RELEASE ORAL at 05:33

## 2018-05-15 NOTE — CP.PCM.PN
Subjective





- Date & Time of Evaluation


Date of Evaluation: 05/15/18


Time of Evaluation: 13:00





- Subjective


Subjective: 





no acute arm or leg pain sitting in chair





Objective





- Vital Signs/Intake and Output


Vital Signs (last 24 hours): 


 











Temp Pulse Resp BP Pulse Ox


 


 98 F   81   22   140/66   96 


 


 05/15/18 08:00  05/15/18 08:55  05/15/18 08:00  05/15/18 08:55  05/15/18 08:00








Intake and Output: 


 











 05/15/18 05/15/18





 06:59 18:59


 


Intake Total 400 


 


Output Total 2650 


 


Balance -2250 














- Medications


Medications: 


 Current Medications





Acetaminophen (Tylenol 325mg Tab)  325 mg PO Q6 PRN


   PRN Reason: Pain 1-10


Al Hydrox/Mg Hydrox/Simethicone (Maalox Plus 30 Ml)  30 ml PO Q6 PRN


   PRN Reason: Indigestion / Heartburn


   Last Admin: 05/13/18 15:16 Dose:  30 ml


Alprazolam (Xanax)  0.25 mg PO TID PRN


   PRN Reason: Anxiety


   Stop: 05/15/18 18:10


   Last Admin: 05/14/18 22:11 Dose:  0.25 mg


Aspirin (Aspirin Chewable)  81 mg PO DAILY [facility]


   Last Admin: 05/15/18 08:55 Dose:  81 mg


Atorvastatin Calcium (Lipitor)  40 mg PO [facility]


   Last Admin: 05/14/18 22:10 Dose:  40 mg


Clotrimazole (Lotrimin 1% Cream)  1 applic TOP 0600,1800 [facility]


   Last Admin: 05/15/18 05:32 Dose:  1 applic


Docusate Sodium (Colace)  100 mg PO [facility]


   Last Admin: 05/14/18 22:09 Dose:  100 mg


Enoxaparin Sodium (Lovenox)  40 mg SC DAILY [facility]


   PRN Reason: Protocol


   Last Admin: 05/15/18 12:30 Dose:  40 mg


Hydralazine HCl (Apresoline)  50 mg PO Q8 [facility]


   Last Admin: 05/15/18 05:31 Dose:  50 mg


Insulin Human Lispro (Humalog)  0 units SC ACHS [facility]


   PRN Reason: Protocol


   Last Admin: 05/15/18 12:29 Dose:  3 u


Lactulose (Enulose)  10 gm PO DAILY [facility]N


   PRN Reason: Constipation


Levothyroxine Sodium (Synthroid)  25 mcg PO 0630 [facility]


   Last Admin: 05/15/18 05:33 Dose:  25 mcg


Losartan Potassium (Cozaar)  50 mg PO DAILY [facility]


   Last Admin: 05/15/18 08:53 Dose:  50 mg


Metformin HCl (Glucophage)  1,000 mg PO BIDWM [facility]


   Last Admin: 05/15/18 08:53 Dose:  1,000 mg


Metoprolol Tartrate (Lopressor)  100 mg PO Q12 [facility]


   Last Admin: 05/15/18 08:54 Dose:  100 mg


Nifedipine (Procardia Xl)  30 mg PO DAILY [facility]


   Last Admin: 05/15/18 08:55 Dose:  30 mg


Pantoprazole Sodium (Protonix Ec Tab)  40 mg PO 0630 [facility]


   Last Admin: 05/15/18 05:33 Dose:  40 mg


Sitagliptin Phosphate (Januvia)  100 mg PO DAILY [facility]


   Last Admin: 05/15/18 08:54 Dose:  100 mg











- Labs


Labs: 


 





 05/15/18 05:30 





 05/15/18 05:30 











- Constitutional


Appears: Well, In Acute Distress





- Head Exam


Head Exam: ATRAUMATIC, NORMAL INSPECTION, NORMOCEPHALIC





- Eye Exam


Eye Exam: EOMI, Normal appearance


Pupil Exam: NORMAL ACCOMODATION, PERRL





- ENT Exam


ENT Exam: Mucous Membranes Moist, Normal Exam





- Neck Exam


Neck Exam: Full ROM, Normal Inspection





- Respiratory Exam


Respiratory Exam: Clear to Ausculation Bilateral, NORMAL BREATHING PATTERN





- Cardiovascular Exam


Cardiovascular Exam: REGULAR RHYTHM





- GI/Abdominal Exam


GI & Abdominal Exam: Soft, Normal Bowel Sounds





- Rectal Exam


Rectal Exam: NORMAL INSPECTION





-  Exam


External exam: NORMAL EXTERNAL EXAM





- Extremities Exam


Extremities Exam: Full ROM, Normal Capillary Refill, Normal Inspection





- Back Exam


Back Exam: NORMAL INSPECTION





- Neurological Exam


Neurological Exam: Alert, Awake


Neuro motor strength exam: Left Upper Extremity: 2/1, Right Upper Extremity: 3, 

Left Lower Extremity: 2/1, Right Lower Extremity: 3





- Psychiatric Exam


Psychiatric exam: Normal Affect, Normal Mood





- Skin


Skin Exam: Dry, Normal Color





Assessment and Plan


(1) CVA (cerebral vascular accident)


Assessment & Plan: 


plan to continue with physical, occupational, rec and speech therapy program   

improve strength of right arm and leg


Status: Acute   





(2) Hyponatremia


Status: Acute   





(3) Anxiety


Status: Acute   





(4) Benign hypertension


Status: Acute   





(5) HTN (hypertension)


Status: Acute

## 2018-05-15 NOTE — CP.PCM.PN
Subjective





- Date & Time of Evaluation


Date of Evaluation: 05/15/18


Time of Evaluation: 07:20





- Subjective


Subjective: 





Patient seen and examined bedside with Dr Kelly. patient improving with PT. She 

denies chest pain, SOB,f,v,abd pain. no overnight events. 








Objective





- Vital Signs/Intake and Output


Vital Signs (last 24 hours): 


 











Temp Pulse Resp BP Pulse Ox


 


 97.9 F   74   20   125/70   97 


 


 05/14/18 20:00  05/15/18 05:31  05/14/18 20:00  05/15/18 05:31  05/14/18 20:00








Intake and Output: 


 











 05/15/18 05/15/18





 06:59 18:59


 


Intake Total 400 


 


Output Total 2650 


 


Balance -2250 














- Medications


Medications: 


 Current Medications





Acetaminophen (Tylenol 325mg Tab)  325 mg PO Q6 PRN


   PRN Reason: Pain 1-10


Al Hydrox/Mg Hydrox/Simethicone (Maalox Plus 30 Ml)  30 ml PO Q6 PRN


   PRN Reason: Indigestion / Heartburn


   Last Admin: 05/13/18 15:16 Dose:  30 ml


Alprazolam (Xanax)  0.25 mg PO TID PRN


   PRN Reason: Anxiety


   Stop: 05/15/18 18:10


   Last Admin: 05/14/18 22:11 Dose:  0.25 mg


Aspirin (Aspirin Chewable)  81 mg PO DAILY [facility]


   Last Admin: 05/14/18 09:28 Dose:  81 mg


Atorvastatin Calcium (Lipitor)  40 mg PO HS [facility]


   Last Admin: 05/14/18 22:10 Dose:  40 mg


Clotrimazole (Lotrimin 1% Cream)  1 applic TOP 0600,1800 [facility]


   Last Admin: 05/15/18 05:32 Dose:  1 applic


Docusate Sodium (Colace)  100 mg PO HS [facility]


   Last Admin: 05/14/18 22:09 Dose:  100 mg


Hydralazine HCl (Apresoline)  50 mg PO Q8 [facility]


   Last Admin: 05/15/18 05:31 Dose:  50 mg


Insulin Human Lispro (Humalog)  0 units SC ACHS [facility]


   PRN Reason: Protocol


   Last Admin: 05/15/18 06:59 Dose:  Not Given


Lactulose (Enulose)  10 gm PO DAILY PRN


   PRN Reason: Constipation


Levothyroxine Sodium (Synthroid)  25 mcg PO 0630 [facility]


   Last Admin: 05/15/18 05:33 Dose:  25 mcg


Losartan Potassium (Cozaar)  50 mg PO DAILY [facility]


   Last Admin: 05/14/18 09:28 Dose:  50 mg


Metformin HCl (Glucophage)  1,000 mg PO BIDWM [facility]


   Last Admin: 05/14/18 16:18 Dose:  1,000 mg


Metoprolol Tartrate (Lopressor)  100 mg PO Q12 [facility]


   Last Admin: 05/14/18 22:10 Dose:  100 mg


Nifedipine (Procardia Xl)  30 mg PO DAILY [facility]


   Last Admin: 05/14/18 09:30 Dose:  30 mg


Pantoprazole Sodium (Protonix Ec Tab)  40 mg PO 0630 [facility]


   Last Admin: 05/15/18 05:33 Dose:  40 mg


Sitagliptin Phosphate (Januvia)  100 mg PO DAILY [facility]


   Last Admin: 05/14/18 09:29 Dose:  100 mg











- Labs


Labs: 


 





 05/15/18 05:30 





 05/15/18 05:30 











- Constitutional


Appears: Non-toxic, No Acute Distress





- Head Exam


Head Exam: ATRAUMATIC, NORMOCEPHALIC





- Eye Exam


Eye Exam: Normal appearance





- ENT Exam


ENT Exam: Mucous Membranes Moist





- Respiratory Exam


Respiratory Exam: Clear to Ausculation Bilateral.  absent: Rales, Rhonchi





- Cardiovascular Exam


Cardiovascular Exam: REGULAR RHYTHM, +S1, +S2





- GI/Abdominal Exam


GI & Abdominal Exam: Soft, Normal Bowel Sounds.  absent: Tenderness





- Extremities Exam


Extremities Exam: Normal Inspection





- Neurological Exam


Neurological Exam: Alert, Awake


Neuro motor strength exam: Left Upper Extremity: 5, Right Upper Extremity: 4, 

Left Lower Extremity: 5, Right Lower Extremity: 4





- Psychiatric Exam


Psychiatric exam: Normal Affect





- Skin


Skin Exam: Normal Color





Assessment and Plan





- Assessment and Plan (Free Text)


Plan: 





Assessment/Plan 





1) CVA


-chronic with  residual right hemiparesis


-record on paper chart: MRI brain left pontine infarct. 2 right sphenoid 

meningiomas. 


-Pt/OT


-speech therapy 





2) DM


hga1c 7.3


-Controlled for her age


-metformin 1000 BID


-Januvia 100 mg daily 





3) HTN


-c/w losartan,


Metoprolol Tartrate 100 mg BID





4) HLD


c/w Lipitor 40 mg daily 








5) Hypothyroidism


c/w home medications





6) Hyponatremia


-resolved


-Na 134


- secondary to SIADH caused for medication 


-Nephrology consult appreciated





7) DVT prophylaxis


-Lovenox 40 mg sc daily

## 2018-05-15 NOTE — CP.PCM.PN
Subjective





- Date & Time of Evaluation


Date of Evaluation: 05/15/18


Time of Evaluation: 14:27





- Subjective


Subjective: 





Follow up Nephrology Consultation Note








Subjective: denies any complaints 





Physical Examination: 


General Appearance: Comfortable, in no acute respiratory distress, co-operative 

. 


Vitals reviewed and noted as below


Head; Atraumatic, normocephalic


ENT: no ulcers no thrush. TOropharynx: no rash or ulcers.


EYES: Pupils are equal, round  Sclera is anicteric.


Neck; supple no lymphadenopathy, no thyromegaly or bruit


Lungs: Normal respiratory rate/effort. Breath sounds bilateral equal and clear


Heart: Normal rate. s1s2 normal. No rub or gallop. 


Extremities: no edema. No varicose veins


Neurological: Patient is alert, awake follows commands R sided weakness


Skin: Warm and dry. Normal turgor. 


Abdomen: Abdomen is soft. Bowel sounds +.


Psych: flat affect


MSK: no joint tenderness or swelling





Assessment: Stable


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


DM, HTN, hx of breast CA





Plan


BP controlled on meds as ordered


she received one dose f sasmca on 5/14, sodium 134 today no dose today 


avoid correction in serum Na >6-8 meq/24 hrs


Glycemic control


Further work up for as per primary team





.





Objective





- Vital Signs/Intake and Output


Vital Signs (last 24 hours): 


 











Temp Pulse Resp BP Pulse Ox


 


 98 F   81   22   140/66   96 


 


 05/15/18 08:00  05/15/18 08:55  05/15/18 08:00  05/15/18 08:55  05/15/18 08:00








Intake and Output: 


 











 05/15/18 05/15/18





 06:59 18:59


 


Intake Total 400 


 


Output Total 2650 


 


Balance -2250 














- Medications


Medications: 


 Current Medications





Acetaminophen (Tylenol 325mg Tab)  325 mg PO Q6 PRN


   PRN Reason: Pain 1-10


Al Hydrox/Mg Hydrox/Simethicone (Maalox Plus 30 Ml)  30 ml PO Q6 PRN


   PRN Reason: Indigestion / Heartburn


   Last Admin: 05/13/18 15:16 Dose:  30 ml


Alprazolam (Xanax)  0.25 mg PO TID PRN


   PRN Reason: Anxiety


   Stop: 05/15/18 18:10


   Last Admin: 05/14/18 22:11 Dose:  0.25 mg


Aspirin (Aspirin Chewable)  81 mg PO DAILY [facility]


   Last Admin: 05/15/18 08:55 Dose:  81 mg


Atorvastatin Calcium (Lipitor)  40 mg PO HS [facility]


   Last Admin: 05/14/18 22:10 Dose:  40 mg


Clotrimazole (Lotrimin 1% Cream)  1 applic TOP 0600,1800 [facility]


   Last Admin: 05/15/18 05:32 Dose:  1 applic


Docusate Sodium (Colace)  100 mg PO HS [facility]


   Last Admin: 05/14/18 22:09 Dose:  100 mg


Enoxaparin Sodium (Lovenox)  40 mg SC DAILY [facility]


   PRN Reason: Protocol


   Last Admin: 05/15/18 12:30 Dose:  40 mg


Hydralazine HCl (Apresoline)  50 mg PO Q8 [facility]


   Last Admin: 05/15/18 05:31 Dose:  50 mg


Insulin Human Lispro (Humalog)  0 units SC ACHS [facility]


   PRN Reason: Protocol


   Last Admin: 05/15/18 12:29 Dose:  3 u


Lactulose (Enulose)  10 gm PO DAILY [facility]


   PRN Reason: Constipation


Levothyroxine Sodium (Synthroid)  25 mcg PO 0630 [facility]


   Last Admin: 05/15/18 05:33 Dose:  25 mcg


Losartan Potassium (Cozaar)  50 mg PO DAILY [facility]


   Last Admin: 05/15/18 08:53 Dose:  50 mg


Metformin HCl (Glucophage)  1,000 mg PO BIDWM [facility]


   Last Admin: 05/15/18 08:53 Dose:  1,000 mg


Metoprolol Tartrate (Lopressor)  100 mg PO Q12 [facility]


   Last Admin: 05/15/18 08:54 Dose:  100 mg


Nifedipine (Procardia Xl)  30 mg PO DAILY [facility]


   Last Admin: 05/15/18 08:55 Dose:  30 mg


Pantoprazole Sodium (Protonix Ec Tab)  40 mg PO 0630 [facility]


   Last Admin: 05/15/18 05:33 Dose:  40 mg


Sitagliptin Phosphate (Januvia)  100 mg PO DAILY [facility]


   Last Admin: 05/15/18 08:54 Dose:  100 mg











- Labs


Labs: 


 





 05/15/18 05:30 





 05/15/18 05:30

## 2018-05-16 LAB
BUN SERPL-MCNC: 22 MG/DL (ref 7–17)
CALCIUM SERPL-MCNC: 9.4 MG/DL (ref 8.4–10.2)
GFR NON-AFRICAN AMERICAN: > 60

## 2018-05-16 RX ADMIN — INSULIN LISPRO SCH: 100 INJECTION, SOLUTION INTRAVENOUS; SUBCUTANEOUS at 17:12

## 2018-05-16 RX ADMIN — PANTOPRAZOLE SODIUM SCH MG: 40 TABLET, DELAYED RELEASE ORAL at 05:58

## 2018-05-16 RX ADMIN — ENOXAPARIN SODIUM SCH MG: 40 INJECTION SUBCUTANEOUS at 08:31

## 2018-05-16 RX ADMIN — INSULIN LISPRO SCH: 100 INJECTION, SOLUTION INTRAVENOUS; SUBCUTANEOUS at 21:18

## 2018-05-16 RX ADMIN — NIFEDIPINE SCH MG: 30 TABLET, FILM COATED, EXTENDED RELEASE ORAL at 08:33

## 2018-05-16 RX ADMIN — ALUMINUM HYDROXIDE, MAGNESIUM HYDROXIDE, AND SIMETHICONE PRN ML: 200; 200; 20 SUSPENSION ORAL at 23:57

## 2018-05-16 RX ADMIN — INSULIN LISPRO SCH: 100 INJECTION, SOLUTION INTRAVENOUS; SUBCUTANEOUS at 07:04

## 2018-05-16 RX ADMIN — INSULIN LISPRO SCH U: 100 INJECTION, SOLUTION INTRAVENOUS; SUBCUTANEOUS at 12:00

## 2018-05-16 NOTE — PSY.TMCNF
Nursing





- Vital Signs


Vital Signs (Last 8 hours): 


 Vital Signs











  05/16/18 05/16/18 05/16/18





  05:58 08:31 08:32


 


Temperature   


 


Pulse Rate 65  65


 


Respiratory   





Rate   


 


Blood Pressure 127/70 140/82 140/82


 


O2 Sat by Pulse   





Oximetry   














  05/16/18 05/16/18 05/16/18





  08:33 08:46 09:40


 


Temperature  98.9 F 98.9 F


 


Pulse Rate  76 76


 


Respiratory  21 21





Rate   


 


Blood Pressure 140/82 131/70 131/70


 


O2 Sat by Pulse  97 





Oximetry   











Pain: 0





- Precautions:


Precautions: Fall Prevention, Aspiration, Pressure Ulcer





- Medications/Other Issues


Comment: Pt at moderate nutritional risk.  goals: 1.  Pt to consume % of 

meals.  2.  Blood glucoses to be between  mg/dl.  Follow-up due on 05/18/ 2018





- Consults


Comment: Dr. Guzman, Dr. Simpson-Gibilisco





- Toileting


Toileting: Dependent





- Bladder Management


Bladder Pattern: Normal


Voiding Method: Toilet


Bladder Management: Dependent


Frequency of Accidents: >5





- Bowel Management


Bowel Pattern: Incontinent


Bowel Management: Dependent


Frequency of Accidents: >5





- Transfers


Transfers: Maximal Assistance





- ADL's


ADL's: Maximal Assistance





- Pain Management


Comments: denies pain





- Patient/Family Teaching


Comments: Care post CVA and safety precautions





- Goals/Time Frame


Comments: Per multidisciplinary care plan and goals





- Provider


Provider: Mei LRN RN CRRN





Physical Therapy





- Bed Mobility


Bed Mobility: Minimal Assistance, Moderate Assistance





- Transfers


Sit to Stand: Verbal Cues, Minimal Assistance, Moderate Assistance


Comment: practiced from chair to/ from mat and sit to stand with L UE support.  

VCs for forward bending to wieghtshift anteriorly for sit to stand and VCs/ PT 

assist for stabilizing R LE to prevent scizzoring





- Ambulation


Level of Assistance: Verbal Cues, Minimal Assistance, Moderate Assistance


Distance (ft.): 20


Comment: utilized givmor sling R UE.  x1, 8' x 2 in // bars w/ w/c follow.  20' 

L wall rail w/ w/c follow.  max vc/tc for upright posture, midline orientation, 

wt shifting, sequencing, RLE placement (tends to perform hip add during stance)

.  Pt tends to lean posteriorly t/o gait cycle, either wt shift excessively (to 

contralateral side) or poor wt shift (to contralateral side), R hip add (

scissoring) during initial contact.  -->midstance,.  greater amb distance 

tolerated and improved quality after BioFreeze/STM and PT demo of desired amb 

strategy





- Stair Negotiation


Stairs: Level of Assistance: Not Tested





- Standing Balance


Static Stand: Minimal Assistance


Dynamic Stand: Moderate Assistance


Comment: supported





- Pain


Pain (assessed during therapy session): 0


Comment: Patient reported that she feels pain at (R) knee when taking steps 

only. No c/o pain during/after transfers.  Improvement noted with biofreeze and 

STM





- Insight/Carryover


Insight/Carryover: Fair





- Patient/Family Education


Comment: Ongoing re: funcitonal mobiltiy, neuro re ed. and pt. ed. re:CVA





- Assessment/Plan


Assessment: Pt. has good motivation and participation in al ltherapy sessions 

even when having pain.  Pt. is making slow and steady gains in standing balance

, posture, gait distance and quality and is ready to move on to HW or WBQC.  

High tone in R LE effects functional mobiltiy and safety with transfers and 

gait at this time.  Pt. will ocnt. to benefit form skilled PT to optimize 

safety and funcitonal indpendence with DME.





- Goals


Timeframe: 1 week


Goals: bed mobility CS.  transfers min A with AAD.  gait min A x 50ft. with AAD 

(HW).  Standing balance min A dynamic supported





- Provider


Therapist: Lin Lopez PT


License Number: [id number]





Occupational Therapy





- Arousal/Attention/Orientation


Patient Orientation: Person, Place, Time, Appropriate to Age, Appropriate to 

Situation





- ADL/IADL


Self Feeding: Supervision, Verbal Cues, Set-up Help


Grooming: Verbal Cues, Minimal Assistance


Dressing-Upper Extremity: Maximum Assistance


Dressing-Lower Extremity: Maximum Assistance, Dependent





- Sitting Balance


Static Sitting: Supervision


Dynamic Sitting: Contact Guard Assist, Minimal Assistance





- Transfers


Wheelchair to Bed Transfers: Moderate Assistance


Toilet Transfers: Moderate Assistance





- Wheelchair Management


Level of Assistance: Dependent





- Upper Extremity Status


Right Upper Extremity Comment: PROM: WFL.  MMT: 1/5 (tone noted to be improving)


Left Upper Extremity Comment: ROM: WFL.  MMT: 4/5





- Pain


Pain (assessed during therapy session): 0


Comment: Patient reported that she feels pain at (R) knee when taking steps 

only. No c/o pain during/after transfers.  Improvement noted with biofreeze and 

STM





- Insight/Carryover


Insight/Carryover: Fair





- Patient/Family Education


Comment: Ongoing re: funcitonal mobiltiy, neuro re ed. and pt. ed. re:CVA





- Assessment/Plan


Assessment: Pt. has good motivation and participation in al ltherapy sessions 

even when having pain.  Pt. is making slow and steady gains in standing balance

, posture, gait distance and quality and is ready to move on to HW or WBQC.  

High tone in R LE effects functional mobiltiy and safety with transfers and 

gait at this time.  Pt. will ocnt. to benefit form skilled PT to optimize 

safety and funcitonal indpendence with DME.





- Goals


Timeframe: 1 week


Goals: bed mobility CS.  transfers min A with AAD.  gait min A x 50ft. with AAD 

(HW).  Standing balance min A dynamic supported





- Provider


Therapist: Siria JAMIL


License Number: [id number]





Speech Therapy





- Consult Information


Patient on Program: Yes


Medical Diagnosis: CVA


Treatment Diagnosis: -MILD-MODERATE COGNITIVE-LINGUISTIC DEFICITS.  -MILD 

DYSARTHRIA.  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





- Assessment


Problem Solving Impairment: Moderate


Memory Impairment: Moderate


Speech/Articulation Impairment: Mild


Dysphagia/Swallowing Impairment: Mild





- Plan


Assessment: Pt. has good motivation and participation in al ltherapy sessions 

even when having pain.  Pt. is making slow and steady gains in standing balance

, posture, gait distance and quality and is ready to move on to HW or WBQC.  

High tone in R LE effects functional mobiltiy and safety with transfers and 

gait at this time.  Pt. will ocnt. to benefit form skilled PT to optimize 

safety and funcitonal indpendence with DME.





- Provider


Therapist: Sonali Melendez


License Number: [id number]





Recreational Therapy





- Participation


Participation: Participates in Individual and/or Group Sessions





- Attendance


Attendance: 3-5 times per week





- Activities


Leisure Activities: Cards and Games





- Socialization


Level of Socialization: Initiates/interacts freely with care givers and peer





- Diversional Time


Diversional Time: watching television





- Assessment


Assessment/Plan: Pt. has good motivation and participation in al ltherapy 

sessions even when having pain.  Pt. is making slow and steady gains in 

standing balance, posture, gait distance and quality and is ready to move on to 

HW or WBQC.  High tone in R LE effects functional mobiltiy and safety with 

transfers and gait at this time.  Pt. will ocnt. to benefit form skilled PT to 

optimize safety and funcitonal indpendence with DME.





- Provider


Therapist: Maria Esther Newman, [facility] #[id number]





Nutrition





- Current Diet


Current Diet/ Supplement/ Feedings: Low consistent CHO Mech altered(finely 

chopped)thin liquids





- Appetite


Percent Meal Consumed: %





- Comments


Comments: Care post CVA and safety precautions





- Assessment/Goals/Time Frame


Assessment/Goals/Time Frame: Pt at moderate nutritional risk.  goals: 1.  Pt to 

consume % of meals.  2.  Blood glucoses to be between  mg/dl.  

Follow-up due on 05/18/2018





- Provider


Provider: Mary Jane Londono RD





Case Management





- Discharge Plan


Discharge Plan: Home with significant other/family





Rehabilitation Plan





- Treatment Plan


Treatment Plan: Physical Therapy, Occupational Therapy, Speech, Dietary, Patient

/Family Education





- Recommendation


Recommendation: Physical Therapy, Occupational Therapy, Speech, Dietary, Patient

/Family Education





- Discharge Plan


Discharge to: Home (june 1)

## 2018-05-16 NOTE — CP.PCM.PN
Subjective





- Date & Time of Evaluation


Date of Evaluation: 05/16/18


Time of Evaluation: 09:26





- Subjective


Subjective: 





Follow up Nephrology Consultation Note








Subjective: denies any complaints denies n/v





Physical Examination: 


General Appearance: Comfortable, in no acute respiratory distress, co-operative 

. 


Vitals reviewed and noted as below


Head; Atraumatic, normocephalic


ENT: no ulcers no thrush. oropharynx: no rash or ulcers.


EYES: Pupils are equal, round  Sclera is anicteric.


Neck; supple no lymphadenopathy, no thyromegaly or bruit


Lungs: Normal respiratory rate/effort. Breath sounds bilateral equal and clear


Heart: Normal rate. s1s2 normal. No rub or gallop. 


Extremities: no edema. No varicose veins


Neurological: Patient is alert, awake follows commands R sided weakness


Skin: Warm and dry. Normal turgor. 


Abdomen: Abdomen is soft. Bowel sounds +.


Psych: flat affect


MSK: no joint tenderness or swelling





Assessment: Stable


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


DM, HTN, hx of breast CA





Plan


bp stable


last dose of tolvaptan on 5/14 - awaiting repeat bmp today to decide on further 

dose or not


Glycemic control per primary


Further work up for as per primary team








Objective





- Vital Signs/Intake and Output


Vital Signs (last 24 hours): 


 











Temp Pulse Resp BP Pulse Ox


 


 98.9 F   76   21   131/70   97 


 


 05/16/18 08:46  05/16/18 08:46  05/16/18 08:46  05/16/18 08:46  05/16/18 08:46








Intake and Output: 


 











 05/16/18 05/16/18





 06:59 18:59


 


Intake Total  200


 


Output Total  750


 


Balance  -550














- Medications


Medications: 


 Current Medications





Acetaminophen (Tylenol 325mg Tab)  325 mg PO Q6 PRN


   PRN Reason: Pain 1-10


Al Hydrox/Mg Hydrox/Simethicone (Maalox Plus 30 Ml)  30 ml PO Q6 PRN


   PRN Reason: Indigestion / Heartburn


   Last Admin: 05/13/18 15:16 Dose:  30 ml


Alprazolam (Xanax)  0.25 mg PO TID PRN


   PRN Reason: Anxiety


   Last Admin: 05/15/18 22:44 Dose:  0.25 mg


Aspirin (Aspirin Chewable)  81 mg PO DAILY [facility]


   Last Admin: 05/16/18 08:33 Dose:  81 mg


Atorvastatin Calcium (Lipitor)  40 mg PO HS [facility]


   Last Admin: 05/15/18 21:54 Dose:  40 mg


Clotrimazole (Lotrimin 1% Cream)  1 applic TOP 0600,1800 [facility]


   Last Admin: 05/16/18 06:00 Dose:  1 applic


Docusate Sodium (Colace)  100 mg PO HS [facility]


   Last Admin: 05/15/18 21:54 Dose:  100 mg


Enoxaparin Sodium (Lovenox)  40 mg SC DAILY [facility]


   PRN Reason: Protocol


   Last Admin: 05/16/18 08:31 Dose:  40 mg


Hydralazine HCl (Apresoline)  50 mg PO Q8 [facility]


   Last Admin: 05/16/18 05:58 Dose:  50 mg


Insulin Human Lispro (Humalog)  0 units SC ACHS [facility]


   PRN Reason: Protocol


   Last Admin: 05/16/18 07:04 Dose:  Not Given


Lactulose (Enulose)  10 gm PO DAILY [facility]


   PRN Reason: Constipation


Levothyroxine Sodium (Synthroid)  25 mcg PO 0630 [facility]


   Last Admin: 05/16/18 05:59 Dose:  25 mcg


Losartan Potassium (Cozaar)  50 mg PO DAILY [facility]


   Last Admin: 05/16/18 08:31 Dose:  50 mg


Metformin HCl (Glucophage)  1,000 mg PO BIDWM [facility]


   Last Admin: 05/16/18 08:32 Dose:  1,000 mg


Metoprolol Tartrate (Lopressor)  100 mg PO Q12 [facility]


   Last Admin: 05/16/18 08:32 Dose:  100 mg


Nifedipine (Procardia Xl)  30 mg PO DAILY [facility]


   Last Admin: 05/16/18 08:33 Dose:  30 mg


Pantoprazole Sodium (Protonix Ec Tab)  40 mg PO 0630 [facility]


   Last Admin: 05/16/18 05:58 Dose:  40 mg


Sitagliptin Phosphate (Januvia)  100 mg PO DAILY [facility]


   Last Admin: 05/16/18 08:32 Dose:  100 mg











- Labs


Labs: 


 





 05/15/18 05:30 





 05/15/18 05:30

## 2018-05-16 NOTE — CP.PCM.PN
Subjective





- Date & Time of Evaluation


Date of Evaluation: 05/16/18


Time of Evaluation: 14:00





- Subjective


Subjective: 





patient more alert awake , motivated no complaints of pain





Objective





- Vital Signs/Intake and Output


Vital Signs (last 24 hours): 


 











Temp Pulse Resp BP Pulse Ox


 


 98.9 F   76   21   134/66   97 


 


 05/16/18 09:40  05/16/18 09:40  05/16/18 09:40  05/16/18 13:06  05/16/18 08:46








Intake and Output: 


 











 05/16/18 05/16/18





 06:59 18:59


 


Intake Total  660


 


Output Total  1450


 


Balance  -790














- Medications


Medications: 


 Current Medications





Acetaminophen (Tylenol 325mg Tab)  325 mg PO Q6 PRN


   PRN Reason: Pain 1-10


Al Hydrox/Mg Hydrox/Simethicone (Maalox Plus 30 Ml)  30 ml PO Q6 PRN


   PRN Reason: Indigestion / Heartburn


   Last Admin: 05/13/18 15:16 Dose:  30 ml


Alprazolam (Xanax)  0.25 mg PO TID PRN


   PRN Reason: Anxiety


   Last Admin: 05/15/18 22:44 Dose:  0.25 mg


Aspirin (Aspirin Chewable)  81 mg PO DAILY [facility]


   Last Admin: 05/16/18 08:33 Dose:  81 mg


Atorvastatin Calcium (Lipitor)  40 mg PO HS [facility]


   Last Admin: 05/15/18 21:54 Dose:  40 mg


Clotrimazole (Lotrimin 1% Cream)  1 applic TOP 0600,1800 [facility]


   Last Admin: 05/16/18 17:12 Dose:  1 applic


Docusate Sodium (Colace)  100 mg PO [facility]


   Last Admin: 05/15/18 21:54 Dose:  100 mg


Enoxaparin Sodium (Lovenox)  40 mg SC DAILY [facility]


   PRN Reason: Protocol


   Last Admin: 05/16/18 08:31 Dose:  40 mg


Hydralazine HCl (Apresoline)  50 mg PO Q8 [facility]


   Last Admin: 05/16/18 13:06 Dose:  50 mg


Insulin Human Lispro (Humalog)  0 units SC ACHS [facility]


   PRN Reason: Protocol


   Last Admin: 05/16/18 17:12 Dose:  Not Given


Lactulose (Enulose)  10 gm PO DAILY [facility]


   PRN Reason: Constipation


Levothyroxine Sodium (Synthroid)  25 mcg PO 0630 [facility]


   Last Admin: 05/16/18 05:59 Dose:  25 mcg


Losartan Potassium (Cozaar)  50 mg PO DAILY [facility]


   Last Admin: 05/16/18 08:31 Dose:  50 mg


Metformin HCl (Glucophage)  1,000 mg PO BIDWM [facility]


   Last Admin: 05/16/18 17:12 Dose:  1,000 mg


Metoprolol Tartrate (Lopressor)  100 mg PO Q12 [facility]


   Last Admin: 05/16/18 08:32 Dose:  100 mg


Nifedipine (Procardia Xl)  30 mg PO DAILY [facility]


   Last Admin: 05/16/18 08:33 Dose:  30 mg


Pantoprazole Sodium (Protonix Ec Tab)  40 mg PO 0630 [facility]


   Last Admin: 05/16/18 05:58 Dose:  40 mg


Sitagliptin Phosphate (Januvia)  100 mg PO DAILY [facility]


   Last Admin: 05/16/18 08:32 Dose:  100 mg











- Labs


Labs: 


 





 05/15/18 05:30 





 05/16/18 11:07 











- Head Exam


Head Exam: ATRAUMATIC, NORMAL INSPECTION, NORMOCEPHALIC





- Eye Exam


Eye Exam: EOMI, Normal appearance


Pupil Exam: NORMAL ACCOMODATION, PERRL





- ENT Exam


ENT Exam: Mucous Membranes Moist, Normal Exam





- Neck Exam


Neck Exam: Full ROM, Normal Inspection





- Respiratory Exam


Respiratory Exam: Clear to Ausculation Bilateral, NORMAL BREATHING PATTERN





- Cardiovascular Exam


Cardiovascular Exam: REGULAR RHYTHM





- GI/Abdominal Exam


GI & Abdominal Exam: Soft, Normal Bowel Sounds





- Rectal Exam


Rectal Exam: NORMAL INSPECTION





-  Exam


External exam: NORMAL EXTERNAL EXAM





- Extremities Exam


Extremities Exam: Full ROM, Normal Capillary Refill, Normal Inspection





- Back Exam


Back Exam: NORMAL INSPECTION





- Neurological Exam


Neurological Exam: Alert, Awake


Neuro motor strength exam: Left Upper Extremity: 3, Right Upper Extremity: 2/1, 

Left Lower Extremity: 3, Right Lower Extremity: 2/1





- Psychiatric Exam


Psychiatric exam: Normal Mood





- Skin


Skin Exam: Normal Color





Assessment and Plan


(1) CVA (cerebral vascular accident)


Assessment & Plan: 


plan for pt, ot rec and Speech  for team conference for today  Dc jun 1st  

possible subacute


Status: Acute   





(2) Hyponatremia


Status: Acute   





(3) Anxiety


Status: Acute   





(4) Benign hypertension


Status: Acute   





(5) HTN (hypertension)


Status: Acute

## 2018-05-16 NOTE — PN
DATE:  05/16/2018



SUBJECTIVE:  The patient is seen and examined.  Interim events noted. 

Physiatry followup and intervention noted and appreciated.  The patient

remains in Acute Rehab Unit.  Nephrology followup and intervention noted

and appreciated.  The patient feels okay.  Denies any specific complaints. 

No chest pain or shortness of breath, although does complain of not being

able to sleep well, but according to nursing staff, the patient slept well.



PHYSICAL EXAMINATION:

GENERAL:  The patient is in no acute distress.

VITAL SIGNS:  Stable.

HEART:  S1 and S2, normal and regular.

LUNGS:  Good bilateral air exchange.

ABDOMEN:  Soft and nontender.

EXTREMITIES:  No edema, no calf swelling.  No tenderness.  No acute

ischemia.

CENTRAL NERVOUS SYSTEM:  Essentially unchanged.  The patient is slowly

_____.



DIAGNOSTIC DATA:  Available diagnostic data reviewed.  Sodium is 134.



PLAN:  Overall, the patient's general medical condition is stable.  Plan as

ordered.





__________________________________________

Enrique Kelly MD





DD:  05/16/2018 6:37:55

DT:  05/16/2018 7:20:57

Job # [id number]

## 2018-05-17 LAB
ALBUMIN SERPL-MCNC: 3.8 G/DL (ref 3.5–5)
ALBUMIN/GLOB SERPL: 1.2 {RATIO} (ref 1–2.1)
ALT SERPL-CCNC: 49 U/L (ref 9–52)
AST SERPL-CCNC: 35 U/L (ref 14–36)
BASOPHILS # BLD AUTO: 0 K/UL (ref 0–0.2)
BASOPHILS NFR BLD: 0.8 % (ref 0–2)
BUN SERPL-MCNC: 20 MG/DL (ref 7–17)
CALCIUM SERPL-MCNC: 9.1 MG/DL (ref 8.4–10.2)
EOSINOPHIL # BLD AUTO: 0.3 K/UL (ref 0–0.7)
EOSINOPHIL NFR BLD: 4 % (ref 0–7)
EOSINOPHIL NFR BLD: 6.1 % (ref 0–4)
ERYTHROCYTE [DISTWIDTH] IN BLOOD BY AUTOMATED COUNT: 13.9 % (ref 11.5–14.5)
GFR NON-AFRICAN AMERICAN: > 60
HGB BLD-MCNC: 11.8 G/DL (ref 12–16)
HYPOCHROMIC: SLIGHT
LYMPHOCYTE: 2 % (ref 20–50)
LYMPHOCYTES # BLD AUTO: 0.4 K/UL (ref 1–4.3)
LYMPHOCYTES NFR BLD AUTO: 7.4 % (ref 20–40)
MCH RBC QN AUTO: 30.6 PG (ref 27–31)
MCHC RBC AUTO-ENTMCNC: 33.7 G/DL (ref 33–37)
MCV RBC AUTO: 90.8 FL (ref 81–99)
MONOCYTE: 7 % (ref 0–10)
MONOCYTES # BLD: 0.6 K/UL (ref 0–0.8)
MONOCYTES NFR BLD: 11.9 % (ref 0–10)
NEUTROPHILS # BLD: 3.9 K/UL (ref 1.8–7)
NEUTROPHILS NFR BLD AUTO: 73.8 % (ref 50–75)
NEUTROPHILS NFR BLD AUTO: 83 % (ref 42–75)
NEUTS BAND NFR BLD: 3 % (ref 0–2)
NRBC BLD AUTO-RTO: 0.2 % (ref 0–0)
PLATELET # BLD EST: NORMAL 10*3/UL
PLATELET # BLD: 353 K/UL (ref 130–400)
PMV BLD AUTO: 6.5 FL (ref 7.2–11.7)
RBC # BLD AUTO: 3.85 MIL/UL (ref 3.8–5.2)
TOTAL CELLS COUNTED BLD: 100
VARIANT LYMPHS NFR BLD MANUAL: 1 % (ref 0–0)
WBC # BLD AUTO: 5.3 K/UL (ref 4.8–10.8)

## 2018-05-17 RX ADMIN — NIFEDIPINE SCH MG: 30 TABLET, FILM COATED, EXTENDED RELEASE ORAL at 08:51

## 2018-05-17 RX ADMIN — INSULIN LISPRO SCH: 100 INJECTION, SOLUTION INTRAVENOUS; SUBCUTANEOUS at 17:18

## 2018-05-17 RX ADMIN — INSULIN LISPRO SCH: 100 INJECTION, SOLUTION INTRAVENOUS; SUBCUTANEOUS at 06:37

## 2018-05-17 RX ADMIN — PANTOPRAZOLE SODIUM SCH MG: 40 TABLET, DELAYED RELEASE ORAL at 05:36

## 2018-05-17 RX ADMIN — INSULIN LISPRO SCH: 100 INJECTION, SOLUTION INTRAVENOUS; SUBCUTANEOUS at 12:26

## 2018-05-17 RX ADMIN — ALUMINUM HYDROXIDE, MAGNESIUM HYDROXIDE, AND SIMETHICONE PRN ML: 200; 200; 20 SUSPENSION ORAL at 15:48

## 2018-05-17 RX ADMIN — ENOXAPARIN SODIUM SCH MG: 40 INJECTION SUBCUTANEOUS at 08:52

## 2018-05-17 RX ADMIN — INSULIN LISPRO SCH: 100 INJECTION, SOLUTION INTRAVENOUS; SUBCUTANEOUS at 21:23

## 2018-05-17 NOTE — CON
DATE:



COMPREHENSIVE UROLOGIC CONSULTATION



REASON FOR CONSULTATION:  Urinary retention, status post stroke.



BRIEF HISTORY:  The patient is an 82-year-old female status post a stroke 1

month ago who is currently in rehab at [facility]

and was found to be in urinary retention.  The patient voided well prior to

the stroke at home and needed no assistance at home and was found to have a

distended bladder with urinary incontinence and the patient was

catheterized and found to have over 2000 mL of residual urine.  The patient

currently is on intermittent catheterizations every 6 to 8 hours with over

6 to 700 mL of residual urine.  She currently is in no acute distress.



PAST MEDICAL HISTORY:  Includes diabetes mellitus, hypertension,

hyperlipidemia, and a new diagnosis of syndrome of inappropriate ADH.



PHYSICAL EXAMINATION:

GENERAL:  The patient is a well-developed, well-nourished female.  She is

alert.  She is oriented.

HEENT:  Grossly within normal limits.

ABDOMEN:  Soft and currently not distended.  No CVA tenderness.  No

suprapubic tenderness.  She does have a palpable umbilical hernia.

GYN:  Her bladder is not dropped in the resting position and also does not

drop with Valsalva at this time.  There is no palpable ballotable vaginal

masses at this time.  She needs nursing assistance to help her get out of

wheelchair and also into bed and she has difficulty standing especially for

this examination.



LABORATORY EVALUATION:  Laboratory evaluation on 05/16/2018 showed chem

profile, which included sodium of 130, potassium of 4.1, chloride of 95,

CO2 of 24, BUN and creatinine of 22 and 0.8 respectively with the GFR of

greater than 60.  Random glucose was 153.  Calcium was 9.4.  Her last CBC

on 05/15/2018 showed WBC count of 6.2, hemoglobin of 11.3, and hematocrit

of 32.2 with the platelet count of 371,000 indicating a moderate anemia. 

Her urinalysis on 10/05/2015 showed the color was yellow, clarity was

clear, pH was 6, specific gravity 1.005, trace protein, normal glucose,

ketones negative, blood negative, nitrite negative, bilirubin negative, and

normal urobilinogen.  Leukocyte esterase was negative.  Less than 1 WBC, 1

RBC, and rare bacteria per high-power field.



DIAGNOSTIC IMPRESSION:  For this patient is urinary retention, status post

stroke.

















PLAN:  For this patient is to try to continue intermittent catheterizations

at least every 8 hours and we will start the patient on Flomax 0.4 mg

daily.  We will also send the urine for complete analysis and C&S.  It will

be noted that the patient does have a very large bladder capacity of over

2000 mL, so if the patient is able to void and is incontinent, the

intermittent catheterizations can be stopped if she is comfortable and

her BUN and creatinine remains stable.  If her BUN and creatinine start to

raise and the residual urine are high greater than 1000 mL, the patient

will need intermittent catheterization.





__________________________________________

Abraham Gipson MD





DD:  05/17/2018 10:42:08

DT:  05/17/2018 11:41:15

Job # [id number]

MTDD

## 2018-05-17 NOTE — CP.PCM.PN
Subjective





- Date & Time of Evaluation


Date of Evaluation: 05/17/18


Time of Evaluation: 07:10





- Subjective


Subjective: 





Patietn seen and examined bedside with Dr giordano. Patient reports doing better 

with PT. Patient with urine retention this morning. Had 1 BM this morning. 

Denies fever, chest pain, SOB, new focal motor deficit. 


Urology consult pending


 





Objective





- Vital Signs/Intake and Output


Vital Signs (last 24 hours): 


 











Temp Pulse Resp BP Pulse Ox


 


 97.2 F L  62   22   121/67   98 


 


 05/17/18 08:25  05/17/18 08:25  05/17/18 08:25  05/17/18 08:25  05/17/18 08:25








Intake and Output: 


 











 05/17/18 05/17/18





 06:59 18:59


 


Intake Total 300 


 


Output Total 1000 


 


Balance -700 














- Medications


Medications: 


 Current Medications





Acetaminophen (Tylenol 325mg Tab)  325 mg PO Q6 PRN


   PRN Reason: Pain 1-10


Al Hydrox/Mg Hydrox/Simethicone (Maalox Plus 30 Ml)  30 ml PO Q6 PRN


   PRN Reason: Indigestion / Heartburn


   Last Admin: 05/16/18 23:57 Dose:  30 ml


Alprazolam (Xanax)  0.25 mg PO TID PRN


   PRN Reason: Anxiety


   Last Admin: 05/16/18 23:58 Dose:  0.25 mg


Aspirin (Aspirin Chewable)  81 mg PO DAILY [facility]


   Last Admin: 05/16/18 08:33 Dose:  81 mg


Atorvastatin Calcium (Lipitor)  40 mg PO HS [facility]


   Last Admin: 05/16/18 21:13 Dose:  40 mg


Clotrimazole (Lotrimin 1% Cream)  1 applic TOP 0600,1800 [facility]


   Last Admin: 05/17/18 05:36 Dose:  1 applic


Docusate Sodium (Colace)  100 mg PO HS [facility]


   Last Admin: 05/16/18 21:14 Dose:  100 mg


Enoxaparin Sodium (Lovenox)  40 mg SC DAILY [facility]


   PRN Reason: Protocol


   Last Admin: 05/16/18 08:31 Dose:  40 mg


Hydralazine HCl (Apresoline)  50 mg PO Q8 [facility]


   Last Admin: 05/17/18 05:36 Dose:  50 mg


Insulin Human Lispro (Humalog)  0 units SC ACHS [facility]


   PRN Reason: Protocol


   Last Admin: 05/17/18 06:37 Dose:  Not Given


Lactulose (Enulose)  10 gm PO DAILY [facility]


   PRN Reason: Constipation


Levothyroxine Sodium (Synthroid)  25 mcg PO 0630 [facility]


   Last Admin: 05/17/18 05:36 Dose:  25 mcg


Losartan Potassium (Cozaar)  50 mg PO DAILY [facility]


   Last Admin: 05/16/18 08:31 Dose:  50 mg


Metformin HCl (Glucophage)  1,000 mg PO BIDWM [facility]


   Last Admin: 05/16/18 17:12 Dose:  1,000 mg


Metoprolol Tartrate (Lopressor)  100 mg PO Q12 [facility]


   Last Admin: 05/16/18 21:19 Dose:  100 mg


Nifedipine (Procardia Xl)  30 mg PO DAILY [facility]


   Last Admin: 05/16/18 08:33 Dose:  30 mg


Pantoprazole Sodium (Protonix Ec Tab)  40 mg PO 0630 [facility]


   Last Admin: 05/17/18 05:36 Dose:  40 mg


Sitagliptin Phosphate (Januvia)  100 mg PO DAILY [facility]


   Last Admin: 05/16/18 08:32 Dose:  100 mg











- Labs


Labs: 


 





 05/15/18 05:30 





 05/16/18 11:07 











- Constitutional


Appears: Non-toxic, No Acute Distress





- Head Exam


Head Exam: ATRAUMATIC, NORMOCEPHALIC





- Eye Exam


Eye Exam: Normal appearance





- ENT Exam


ENT Exam: Mucous Membranes Moist





- Neck Exam


Neck Exam: Normal Inspection





- Cardiovascular Exam


Cardiovascular Exam: REGULAR RHYTHM, +S1, +S2





- GI/Abdominal Exam


GI & Abdominal Exam: Soft, Normal Bowel Sounds.  absent: Tenderness





- Neurological Exam


Neurological Exam: Alert, Awake


Neuro motor strength exam: Left Upper Extremity: 4, Right Upper Extremity: 3, 

Left Lower Extremity: 4, Right Lower Extremity: 3





- Psychiatric Exam


Psychiatric exam: Normal Mood





Assessment and Plan





- Assessment and Plan (Free Text)


Plan: 





Assessment/Plan 





1) CVA


-chronic with  residual right hemiparesis


-record on paper chart: MRI brain left pontine infarct. 2 right sphenoid 

meningiomas. 


-Pt/OT


-speech therapy 





2) DM


hga1c 7.3


-Controlled for her age


-metformin 1000 BID


-Januvia 100 mg daily 





3) HTN


-c/w losartan,


Metoprolol Tartrate 100 mg BID





4) HLD


c/w Lipitor 40 mg daily 








5) Hypothyroidism


c/w home medications





6) Hyponatremia


-Na 130


- secondary to SIADH caused for medication 


-Nephrology consult appreciated


-f/u CMP


Urologist consult suggested





7) DVT prophylaxis


-Lovenox 40 mg sc daily

## 2018-05-18 LAB
BACTERIA #/AREA URNS HPF: (no result) /[HPF]
BILIRUB UR-MCNC: NEGATIVE MG/DL
BUN SERPL-MCNC: 23 MG/DL (ref 7–17)
CALCIUM SERPL-MCNC: 9.1 MG/DL (ref 8.4–10.2)
COLOR UR: YELLOW
GFR NON-AFRICAN AMERICAN: > 60
GLUCOSE UR STRIP-MCNC: (no result) MG/DL
LEUKOCYTE ESTERASE UR-ACNC: (no result) LEU/UL
PH UR STRIP: 7 [PH] (ref 5–8)
PROT UR STRIP-MCNC: NEGATIVE MG/DL
RBC # UR STRIP: NEGATIVE /UL
SP GR UR STRIP: 1.01 (ref 1–1.03)
URINE CLARITY: (no result)
UROBILINOGEN UR-MCNC: (no result) MG/DL (ref 0.2–1)

## 2018-05-18 RX ADMIN — INSULIN LISPRO SCH U: 100 INJECTION, SOLUTION INTRAVENOUS; SUBCUTANEOUS at 11:30

## 2018-05-18 RX ADMIN — INSULIN LISPRO SCH: 100 INJECTION, SOLUTION INTRAVENOUS; SUBCUTANEOUS at 06:58

## 2018-05-18 RX ADMIN — PANTOPRAZOLE SODIUM SCH MG: 40 TABLET, DELAYED RELEASE ORAL at 06:03

## 2018-05-18 RX ADMIN — INSULIN LISPRO SCH: 100 INJECTION, SOLUTION INTRAVENOUS; SUBCUTANEOUS at 17:24

## 2018-05-18 RX ADMIN — ALUMINUM HYDROXIDE, MAGNESIUM HYDROXIDE, AND SIMETHICONE PRN ML: 200; 200; 20 SUSPENSION ORAL at 21:52

## 2018-05-18 RX ADMIN — ENOXAPARIN SODIUM SCH MG: 40 INJECTION SUBCUTANEOUS at 08:41

## 2018-05-18 RX ADMIN — NIFEDIPINE SCH MG: 30 TABLET, FILM COATED, EXTENDED RELEASE ORAL at 08:41

## 2018-05-18 RX ADMIN — INSULIN LISPRO SCH: 100 INJECTION, SOLUTION INTRAVENOUS; SUBCUTANEOUS at 21:50

## 2018-05-18 NOTE — CP.PCM.PN
Subjective





- Date & Time of Evaluation


Date of Evaluation: 05/18/18


Time of Evaluation: 14:11





- Subjective


Subjective: 


Follow up Nephrology Consultation Note





Assessment: Stable


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


DM, HTN, hx of breast CA





Plan


BP controlled on meds as ordered


resume samsca 15 mg/day As her serum sodium continued to decrease without this 

medication


avoid correction in serum Na >6-8 meq/24 hrs


Glycemic control


Further work up for as per primary team


Consider workup on her status of breast CA later on as outpt.





Thanks for allowing me to participate in care of your patient. Will follow 

patient with you. Please call if any Qs


Dr Erasmo Tipton


Office: [phone] Cell: [phone] Fax: [phone]





Subjective: Noted events overnight. Patients feels okay. 


Denies chest pain, palpitation, shortness of breath, leg swelling. 


All other negative





Physical Examination: 


General Appearance: Comfortable, in no acute respiratory distress, co-operative 

. 


Vitals reviewed and noted as below


Head; Atraumatic, normocephalic


ENT: no ulcers no thrush. Tongue is midline. Oropharynx: no rash or ulcers.


EYES: Pupils are equal, round and reactive to light accommodation. Eye muscles 

and extraocular movement intact. Sclera is anicteric.


Neck; supple no lymphadenopathy, no thyromegaly or bruit


Lungs: Normal respiratory rate/effort. Breath sounds bilateral equal and clear


Heart: Normal rate. s1s2 normal. No rub or gallop. 


Extremities: no edema. No varicose veins


Neurological: Patient is alert, awake and oriented to person, place and time. 

has rt side weakness and slurred speech


Skin: Warm and dry. Normal turgor. No rash. Palpitation: Normal elasticity for 

age


Abdomen: Abdomen is soft. Bowel sounds +. There is no abdominal tenderness, no 

guarding/rigidity no organomegaly


Psych: normal insight and normal affect/mood


MSK: no joint tenderness or swelling. Digits and nails normal, no deformity


: kidney or bladder not palpable





Labs/imaging reviewed.


Past medical history, past surgical history, family history, social history, 

allergy reviewed and noted as below


Family hx: no hx of CKD. Rest non-contributory 











Objective





- Vital Signs/Intake and Output


Vital Signs (last 24 hours): 


 











Temp Pulse Resp BP Pulse Ox


 


 97.7 F   70   20   116/60   100 


 


 05/18/18 08:00  05/18/18 08:42  05/18/18 08:00  05/18/18 08:42  05/18/18 08:00








Intake and Output: 


 











 05/18/18 05/18/18





 06:59 18:59


 


Intake Total 150 


 


Output Total 1200 


 


Balance -1050 














- Medications


Medications: 


 Current Medications





Acetaminophen (Tylenol 325mg Tab)  325 mg PO Q6 [facility]


   PRN Reason: Pain 1-10


Al Hydrox/Mg Hydrox/Simethicone (Maalox Plus 30 Ml)  30 ml PO Q6 [facility]


   PRN Reason: Indigestion / Heartburn


   Last Admin: 05/17/18 15:48 Dose:  30 ml


Alprazolam (Xanax)  0.25 mg PO TID PRN


   PRN Reason: Anxiety


   Last Admin: 05/17/18 23:32 Dose:  0.25 mg


Aspirin (Aspirin Chewable)  81 mg PO DAILY [facility]


   Last Admin: 05/17/18 08:51 Dose:  81 mg


Atorvastatin Calcium (Lipitor)  40 mg PO HS [facility]


   Last Admin: 05/17/18 21:22 Dose:  40 mg


Clotrimazole (Lotrimin 1% Cream)  1 applic TOP 0600,1800 [facility]


   Last Admin: 05/18/18 06:27 Dose:  1 applic


Docusate Sodium (Colace)  100 mg PO HS [facility]


   Last Admin: 05/17/18 21:23 Dose:  100 mg


Enoxaparin Sodium (Lovenox)  40 mg SC DAILY [facility]


   PRN Reason: Protocol


   Last Admin: 05/18/18 08:41 Dose:  40 mg


Hydralazine HCl (Apresoline)  50 mg PO Q8 [facility]


   Last Admin: 05/18/18 06:02 Dose:  50 mg


Insulin Human Lispro (Humalog)  0 units SC ACHS [facility]


   PRN Reason: Protocol


   Last Admin: 05/18/18 06:58 Dose:  Not Given


Lactulose (Enulose)  10 gm PO DAILY [facility]


   PRN Reason: Constipation


Levothyroxine Sodium (Synthroid)  25 mcg PO 0630 [facility]


   Last Admin: 05/18/18 06:03 Dose:  25 mcg


Losartan Potassium (Cozaar)  50 mg PO DAILY [facility]


   Last Admin: 05/17/18 08:50 Dose:  50 mg


Metformin HCl (Glucophage)  1,000 mg PO BIDWM [facility]


   Last Admin: 05/18/18 08:43 Dose:  1,000 mg


Metoprolol Tartrate (Lopressor)  100 mg PO Q12 [facility]


   Last Admin: 05/18/18 08:42 Dose:  100 mg


Nifedipine (Procardia Xl)  30 mg PO DAILY [facility]


   Last Admin: 05/18/18 08:41 Dose:  30 mg


Pantoprazole Sodium (Protonix Ec Tab)  40 mg PO 0630 [facility]


   Last Admin: 05/18/18 06:03 Dose:  40 mg


Sitagliptin Phosphate (Januvia)  100 mg PO DAILY [facility]


   Last Admin: 05/18/18 08:42 Dose:  100 mg


Tamsulosin HCl (Flomax)  0.4 mg PO HS [facility]


   Last Admin: 05/17/18 21:22 Dose:  0.4 mg


Tolvaptan (Samsca)  15 mg PO DAILY [facility]











- Labs


Labs: 


 





 05/17/18 11:49 





 05/18/18 08:25

## 2018-05-18 NOTE — CP.PCM.PN
Subjective





- Date & Time of Evaluation


Date of Evaluation: 05/18/18


Time of Evaluation: 09:00





- Subjective


Subjective: 





no acute neck or back pain





Objective





- Vital Signs/Intake and Output


Vital Signs (last 24 hours): 


 











Temp Pulse Resp BP Pulse Ox


 


 97.7 F   70   20   116/60   100 


 


 05/18/18 08:00  05/18/18 08:42  05/18/18 08:00  05/18/18 08:42  05/18/18 08:00








Intake and Output: 


 











 05/18/18 05/18/18





 06:59 18:59


 


Intake Total 150 


 


Output Total 1200 


 


Balance -1050 














- Medications


Medications: 


 Current Medications





Acetaminophen (Tylenol 325mg Tab)  325 mg PO Q6 PRN


   PRN Reason: Pain 1-10


Al Hydrox/Mg Hydrox/Simethicone (Maalox Plus 30 Ml)  30 ml PO Q6 PRN


   PRN Reason: Indigestion / Heartburn


   Last Admin: 05/17/18 15:48 Dose:  30 ml


Alprazolam (Xanax)  0.25 mg PO TID PRN


   PRN Reason: Anxiety


   Last Admin: 05/17/18 23:32 Dose:  0.25 mg


Aspirin (Aspirin Chewable)  81 mg PO DAILY [facility]


   Last Admin: 05/17/18 08:51 Dose:  81 mg


Atorvastatin Calcium (Lipitor)  40 mg PO [facility]


   Last Admin: 05/17/18 21:22 Dose:  40 mg


Clotrimazole (Lotrimin 1% Cream)  1 applic TOP 0600,1800 [facility]


   Last Admin: 05/18/18 06:27 Dose:  1 applic


Docusate Sodium (Colace)  100 mg PO [facility]


   Last Admin: 05/17/18 21:23 Dose:  100 mg


Enoxaparin Sodium (Lovenox)  40 mg SC DAILY [facility]


   PRN Reason: Protocol


   Last Admin: 05/18/18 08:41 Dose:  40 mg


Hydralazine HCl (Apresoline)  50 mg PO Q8 [facility]


   Last Admin: 05/18/18 06:02 Dose:  50 mg


Insulin Human Lispro (Humalog)  0 units SC ACHS [facility]


   PRN Reason: Protocol


   Last Admin: 05/18/18 06:58 Dose:  Not Given


Lactulose (Enulose)  10 gm PO DAILY [facility]


   PRN Reason: Constipation


Levothyroxine Sodium (Synthroid)  25 mcg PO 0630 [facility]


   Last Admin: 05/18/18 06:03 Dose:  25 mcg


Losartan Potassium (Cozaar)  50 mg PO DAILY [facility]


   Last Admin: 05/17/18 08:50 Dose:  50 mg


Metformin HCl (Glucophage)  1,000 mg PO BIDWM [facility]


   Last Admin: 05/18/18 08:43 Dose:  1,000 mg


Metoprolol Tartrate (Lopressor)  100 mg PO Q12 [facility]


   Last Admin: 05/18/18 08:42 Dose:  100 mg


Nifedipine (Procardia Xl)  30 mg PO DAILY [facility]


   Last Admin: 05/18/18 08:41 Dose:  30 mg


Pantoprazole Sodium (Protonix Ec Tab)  40 mg PO 0630 [facility]


   Last Admin: 05/18/18 06:03 Dose:  40 mg


Sitagliptin Phosphate (Januvia)  100 mg PO DAILY [facility]


   Last Admin: 05/18/18 08:42 Dose:  100 mg


Tamsulosin HCl (Flomax)  0.4 mg PO HS [facility]


   Last Admin: 05/17/18 21:22 Dose:  0.4 mg


Tolvaptan (Samsca)  15 mg PO DAILY [facility]











- Labs


Labs: 


 





 05/17/18 11:49 





 05/18/18 08:25 











- Head Exam


Head Exam: ATRAUMATIC, NORMAL INSPECTION, NORMOCEPHALIC





- Eye Exam


Eye Exam: EOMI, Normal appearance, PERRL


Pupil Exam: NORMAL ACCOMODATION





- ENT Exam


ENT Exam: Mucous Membranes Moist, Normal Exam





- Neck Exam


Neck Exam: Normal Inspection





- Respiratory Exam


Respiratory Exam: Clear to Ausculation Bilateral, NORMAL BREATHING PATTERN





- Cardiovascular Exam


Cardiovascular Exam: REGULAR RHYTHM





- GI/Abdominal Exam


GI & Abdominal Exam: Soft, Normal Bowel Sounds





- Rectal Exam


Rectal Exam: NORMAL INSPECTION





-  Exam


External exam: NORMAL EXTERNAL EXAM





- Extremities Exam


Extremities Exam: Full ROM, Normal Capillary Refill, Normal Inspection





- Back Exam


Back Exam: NORMAL INSPECTION





- Neurological Exam


Neurological Exam: Alert, Awake


Neuro motor strength exam: Left Upper Extremity: 2/1, Right Upper Extremity: 3, 

Left Lower Extremity: 2/1, Right Lower Extremity: 3





- Psychiatric Exam


Psychiatric exam: Normal Affect, Normal Mood





- Skin


Skin Exam: Dry, Intact





Assessment and Plan


(1) CVA (cerebral vascular accident)


Assessment & Plan: 


to contunue with physical, occupational therapy rec and speech therapy


Status: Acute   





(2) Hyponatremia


Status: Acute   





(3) Anxiety


Status: Acute   





(4) Benign hypertension


Status: Acute   





(5) HTN (hypertension)


Status: Acute

## 2018-05-18 NOTE — CP.PCM.PN
Subjective





- Date & Time of Evaluation


Date of Evaluation: 05/17/18


Time of Evaluation: 15:00





- Subjective


Subjective: 








Follow up Nephrology Consultation Note


Subjective: denies any complaints denies n/v





Physical Examination: 


General Appearance: Comfortable, in no acute respiratory distress, co-operative 

. 


Vitals reviewed and noted as below


Head; Atraumatic, normocephalic


ENT: no ulcers no thrush. oropharynx: no rash or ulcers.


EYES: Pupils are equal, round  Sclera is anicteric.


Neck; supple no lymphadenopathy, no thyromegaly or bruit


Lungs: Normal respiratory rate/effort. Breath sounds bilateral equal and clear


Heart: Normal rate. s1s2 normal. No rub or gallop. 


Extremities: no edema. No varicose veins


Neurological: Patient is alert, awake follows commands R sided weakness


Skin: Warm and dry. Normal turgor. 


Abdomen: Abdomen is soft. Bowel sounds +.


Psych: flat affect


MSK: no joint tenderness or swelling





Assessment: Stable


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


DM, HTN, hx of breast CA





Plan


bp stable


sodium is 127 today, will give a dose of samsca 15 mg today 


Glycemic control per primary


Further work up for as per primary team








Objective





- Vital Signs/Intake and Output


Vital Signs (last 24 hours): 


 











Temp Pulse Resp BP Pulse Ox


 


 97.0 F L  71   20   125/62   98 


 


 05/17/18 20:48  05/18/18 06:02  05/17/18 20:48  05/18/18 06:02  05/17/18 20:48








Intake and Output: 


 











 05/17/18 05/18/18





 18:59 06:59


 


Intake Total 520 


 


Balance 520 














- Medications


Medications: 


 Current Medications





Acetaminophen (Tylenol 325mg Tab)  325 mg PO Q6 PRN


   PRN Reason: Pain 1-10


Al Hydrox/Mg Hydrox/Simethicone (Maalox Plus 30 Ml)  30 ml PO Q6 PRN


   PRN Reason: Indigestion / Heartburn


   Last Admin: 05/17/18 15:48 Dose:  30 ml


Alprazolam (Xanax)  0.25 mg PO TID PRN


   PRN Reason: Anxiety


   Last Admin: 05/17/18 23:32 Dose:  0.25 mg


Aspirin (Aspirin Chewable)  81 mg PO DAILY [facility]


   Last Admin: 05/17/18 08:51 Dose:  81 mg


Atorvastatin Calcium (Lipitor)  40 mg PO HS [facility]


   Last Admin: 05/17/18 21:22 Dose:  40 mg


Clotrimazole (Lotrimin 1% Cream)  1 applic TOP 0600,1800 [facility]


   Last Admin: 05/17/18 17:16 Dose:  1 applic


Docusate Sodium (Colace)  100 mg PO HS [facility]


   Last Admin: 05/17/18 21:23 Dose:  100 mg


Enoxaparin Sodium (Lovenox)  40 mg SC DAILY [facility]


   PRN Reason: Protocol


   Last Admin: 05/17/18 08:52 Dose:  40 mg


Hydralazine HCl (Apresoline)  50 mg PO Q8 [facility]


   Last Admin: 05/18/18 06:02 Dose:  50 mg


Insulin Human Lispro (Humalog)  0 units SC ACHS [facility]


   PRN Reason: Protocol


   Last Admin: 05/17/18 21:23 Dose:  Not Given


Lactulose (Enulose)  10 gm PO DAILY [facility]


   PRN Reason: Constipation


Levothyroxine Sodium (Synthroid)  25 mcg PO 0630 [facility]


   Last Admin: 05/18/18 06:03 Dose:  25 mcg


Losartan Potassium (Cozaar)  50 mg PO DAILY [facility]


   Last Admin: 05/17/18 08:50 Dose:  50 mg


Metformin HCl (Glucophage)  1,000 mg PO BIDWM [facility]


   Last Admin: 05/17/18 17:16 Dose:  1,000 mg


Metoprolol Tartrate (Lopressor)  100 mg PO Q12 [facility]


   Last Admin: 05/17/18 21:22 Dose:  100 mg


Nifedipine (Procardia Xl)  30 mg PO DAILY [facility]


   Last Admin: 05/17/18 08:51 Dose:  30 mg


Pantoprazole Sodium (Protonix Ec Tab)  40 mg PO 0630 [facility]


   Last Admin: 05/18/18 06:03 Dose:  40 mg


Sitagliptin Phosphate (Januvia)  100 mg PO DAILY [facility]


   Last Admin: 05/17/18 08:49 Dose:  100 mg


Tamsulosin HCl (Flomax)  0.4 mg PO HS [facility]


   Last Admin: 05/17/18 21:22 Dose:  0.4 mg











- Labs


Labs: 


 





 05/17/18 11:49 





 05/17/18 11:49

## 2018-05-19 LAB
ALBUMIN SERPL-MCNC: 3.5 G/DL (ref 3.5–5)
ALBUMIN/GLOB SERPL: 1.1 {RATIO} (ref 1–2.1)
ALT SERPL-CCNC: 46 U/L (ref 9–52)
AST SERPL-CCNC: 26 U/L (ref 14–36)
BUN SERPL-MCNC: 28 MG/DL (ref 7–17)
CALCIUM SERPL-MCNC: 9.3 MG/DL (ref 8.4–10.2)
ERYTHROCYTE [DISTWIDTH] IN BLOOD BY AUTOMATED COUNT: 14.3 % (ref 11.5–14.5)
GFR NON-AFRICAN AMERICAN: > 60
HGB BLD-MCNC: 11.1 G/DL (ref 12–16)
MCH RBC QN AUTO: 31.8 PG (ref 27–31)
MCHC RBC AUTO-ENTMCNC: 35.5 G/DL (ref 33–37)
MCV RBC AUTO: 89.5 FL (ref 81–99)
PLATELET # BLD: 354 K/UL (ref 130–400)
RBC # BLD AUTO: 3.48 MIL/UL (ref 3.8–5.2)
WBC # BLD AUTO: 6.7 K/UL (ref 4.8–10.8)

## 2018-05-19 RX ADMIN — INSULIN LISPRO SCH U: 100 INJECTION, SOLUTION INTRAVENOUS; SUBCUTANEOUS at 16:30

## 2018-05-19 RX ADMIN — INSULIN LISPRO SCH: 100 INJECTION, SOLUTION INTRAVENOUS; SUBCUTANEOUS at 21:26

## 2018-05-19 RX ADMIN — ENOXAPARIN SODIUM SCH MG: 40 INJECTION SUBCUTANEOUS at 09:00

## 2018-05-19 RX ADMIN — NIFEDIPINE SCH MG: 30 TABLET, FILM COATED, EXTENDED RELEASE ORAL at 10:04

## 2018-05-19 RX ADMIN — INSULIN LISPRO SCH: 100 INJECTION, SOLUTION INTRAVENOUS; SUBCUTANEOUS at 11:30

## 2018-05-19 RX ADMIN — PANTOPRAZOLE SODIUM SCH MG: 40 TABLET, DELAYED RELEASE ORAL at 06:38

## 2018-05-19 RX ADMIN — INSULIN LISPRO SCH: 100 INJECTION, SOLUTION INTRAVENOUS; SUBCUTANEOUS at 06:40

## 2018-05-19 NOTE — PN
DATE:  05/19/2018



SUBJECTIVE:  The patient is seen and examined.  Interim events noted. 

Consults noted and appreciated.  The patient still has significant residual

urinary retention, although the patient has large bladder capacity.  The

patient is sleepy, arousable, feels okay.  Denies any specific chest pain

or shortness of breath.



PHYSICAL EXAMINATION:

GENERAL:  The patient is in no acute distress.

VITAL SIGNS:  Stable.

HEART:  S1 and S2, normal and regular.

LUNGS:  Good bilateral air exchange.

ABDOMEN:  Soft and nontender.

EXTREMITIES:  No edema.  No calf swelling.  No tenderness.  No acute

ischemia.

CENTRAL NERVOUS SYSTEM:  Essentially unchanged.



DIAGNOSTIC DATA:  Available diagnostic data reviewed.  Sodium level still

is 129.



PLAN:  Overall, the patient is hemodynamically and medically stable.  Plan

as ordered.





__________________________________________

Enrique Kelly MD





DD:  05/19/2018 8:00:12

DT:  05/19/2018 9:16:21

Job # [id number]

## 2018-05-19 NOTE — CP.PCM.PN
Subjective





- Date & Time of Evaluation


Date of Evaluation: 05/19/18


Time of Evaluation: 14:00





- Subjective


Subjective: 





no complaints, friendly , no pain





Objective





- Vital Signs/Intake and Output


Vital Signs (last 24 hours): 


 











Temp Pulse Resp BP Pulse Ox


 


 98.0 F   78   20   120/80   96 


 


 05/19/18 09:48  05/19/18 14:00  05/19/18 09:48  05/19/18 14:00  05/19/18 09:48








Intake and Output: 


 











 05/19/18 05/19/18





 06:59 18:59


 


Intake Total 150 300


 


Output Total 5900 3


 


Balance -5750 297














- Medications


Medications: 


 Current Medications





Acetaminophen (Tylenol 325mg Tab)  325 mg PO Q6 PRN


   PRN Reason: Pain 1-10


   Last Admin: 05/18/18 17:35 Dose:  325 mg


Al Hydrox/Mg Hydrox/Simethicone (Maalox Plus 30 Ml)  30 ml PO Q6 PRN


   PRN Reason: Indigestion / Heartburn


   Last Admin: 05/18/18 21:52 Dose:  30 ml


Alprazolam (Xanax)  0.25 mg PO TID PRN


   PRN Reason: Anxiety


   Last Admin: 05/17/18 23:32 Dose:  0.25 mg


Aspirin (Aspirin Chewable)  [facility]


   Last Admin: 05/19/18 09:00 Dose:  81 mg


Atorvastatin Calcium (Lipitor)  40 mg PO [facility]


   Last Admin: 05/18/18 21:50 Dose:  40 mg


Clotrimazole (Lotrimin 1% Cream)  1 applic TOP 0600,1800 [facility]


   Last Admin: 05/19/18 17:36 Dose:  1 applic


Docusate Sodium (Colace)  100 mg PO [facility]


   Last Admin: 05/18/18 21:50 Dose:  100 mg


Enoxaparin Sodium (Lovenox)  40 mg SC DAILY [facility]


   PRN Reason: Protocol


   Last Admin: 05/19/18 09:00 Dose:  40 mg


Hydralazine HCl (Apresoline)  50 mg PO Q8 [facility]


   Last Admin: 05/19/18 14:00 Dose:  50 mg


Insulin Human Lispro (Humalog)  0 units SC ACHS [facility]


   PRN Reason: Protocol


   Last Admin: 05/19/18 16:30 Dose:  2 u


Lactulose (Enulose)  10 gm PO DAILY [facility]


   PRN Reason: Constipation


Levothyroxine Sodium (Synthroid)  25 mcg PO 0630 [facility]


   Last Admin: 05/19/18 05:50 Dose:  25 mcg


Losartan Potassium (Cozaar)  50 mg PO DAILY [facility]


   Last Admin: 05/19/18 10:05 Dose:  50 mg


Metformin HCl (Glucophage)  1,000 mg PO BIDWM [facility]


   Last Admin: 05/19/18 17:34 Dose:  1,000 mg


Metoprolol Tartrate (Lopressor)  100 mg PO Q12 [facility]


   Last Admin: 05/19/18 08:03 Dose:  100 mg


Nifedipine (Procardia Xl)  30 mg PO DAILY [facility]


   Last Admin: 05/19/18 10:04 Dose:  30 mg


Pantoprazole Sodium (Protonix Ec Tab)  40 mg PO [facility]30 [facility]


   Last Admin: 05/19/18 06:38 Dose:  40 mg


Sitagliptin Phosphate (Januvia)  100 mg PO DAILY [facility]


   Last Admin: 05/19/18 09:00 Dose:  100 mg


Tamsulosin HCl (Flomax)  0.4 mg PO HS [facility]


   Last Admin: 05/18/18 21:51 Dose:  0.4 mg


Tolvaptan (Samsca)  15 mg PO DAILY [facility]


   Last Admin: 05/19/18 10:01 Dose:  15 mg











- Labs


Labs: 


 





 05/19/18 06:30 





 05/19/18 06:30 











- Head Exam


Head Exam: ATRAUMATIC, NORMAL INSPECTION, NORMOCEPHALIC





- Eye Exam


Eye Exam: EOMI, Normal appearance, PERRL


Pupil Exam: NORMAL ACCOMODATION, PERRL





- ENT Exam


ENT Exam: Mucous Membranes Moist, Normal Exam





- Neck Exam


Neck Exam: Normal Inspection





- Respiratory Exam


Respiratory Exam: Clear to Ausculation Bilateral, NORMAL BREATHING PATTERN





- Cardiovascular Exam


Cardiovascular Exam: REGULAR RHYTHM





- GI/Abdominal Exam


GI & Abdominal Exam: Normal Bowel Sounds





- Rectal Exam


Rectal Exam: NORMAL INSPECTION





-  Exam


External exam: NORMAL EXTERNAL EXAM





- Extremities Exam


Extremities Exam: Full ROM, Normal Capillary Refill





- Back Exam


Back Exam: NORMAL INSPECTION





- Neurological Exam


Neurological Exam: Alert, Awake


Neuro motor strength exam: Left Upper Extremity: 3, Right Upper Extremity: 2/1, 

Left Lower Extremity: 3, Right Lower Extremity: 2/1





- Psychiatric Exam


Psychiatric exam: Normal Affect, Normal Mood





- Skin


Skin Exam: Dry, Normal Color





Assessment and Plan


(1) CVA (cerebral vascular accident)


Assessment & Plan: 


to continue with present, therapy program physical, occupational rec and speech 

therapy 


monitor skin and vitals


Status: Acute   





(2) Hyponatremia


Status: Acute   





(3) Anxiety


Status: Acute   





(4) Benign hypertension


Status: Acute   





(5) HTN (hypertension)


Status: Acute

## 2018-05-19 NOTE — CP.PCM.PN
Subjective





- Date & Time of Evaluation


Date of Evaluation: 05/19/18


Time of Evaluation: 09:40





- Subjective


Subjective: 





Follow up Nephrology Consultation Note





Assessment: Stable


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


DM, HTN, hx of breast CA





Plan


BP controlled on meds as ordered


continue daily samsca


na is improved


Glycemic control


Further work up for as per primary team


Consider workup on her status of breast CA later on as outpt.











Subjective:  seen as examined denies n/v





Physical Examination: 


General Appearance: Comfortable, in no acute respiratory distress, co-operative 

. 


Vitals reviewed and noted as below


Head; Atraumatic, normocephalic


ENT: no ulcers no thrush. Tongue is midline. Oropharynx: no rash or ulcers.


EYES: Pupils are equal, round and Eye muscles and extraocular movement intact. 

Sclera is anicteric.


Neck; supple no lymphadenopathy, no thyromegaly or bruit


Lungs: Normal respiratory rate/effort. Breath sounds bilateral equal and clear


Heart: Normal rate. s1s2 normal. No rub or gallop. 


Extremities: no edema. No varicose veins


Neurological: Patient is alert, awake and oriented to person, place and time. 

has rt side weakness and slurred speech


Skin: Warm and dry. Normal turgor. No rash. Palpitation: Normal elasticity for 

age


Abdomen: Abdomen is soft. Bowel sounds +. There is no abdominal tenderness, no 

guarding/rigidity no organomegaly


Psych: normal insight and normal affect/mood


MSK: no joint tenderness or swelling. Digits and nails normal, no deformity


: kidney or bladder not palpable





Labs/imaging reviewed.


Past medical history, past surgical history, family history, social history, 

allergy reviewed and noted as below


Family hx: no hx of CKD. Rest non-contributory 





Objective





- Vital Signs/Intake and Output


Vital Signs (last 24 hours): 


 











Temp Pulse Resp BP Pulse Ox


 


 98.0 F   76   20   130/70   96 


 


 05/19/18 09:48  05/19/18 10:05  05/19/18 09:48  05/19/18 10:05  05/19/18 09:48








Intake and Output: 


 











 05/19/18 05/19/18





 06:59 18:59


 


Intake Total 150 


 


Output Total 5900 


 


Balance -5750 














- Medications


Medications: 


 Current Medications





Acetaminophen (Tylenol 325mg Tab)  325 mg PO Q6 PRN


   PRN Reason: Pain 1-10


   Last Admin: 05/18/18 17:35 Dose:  325 mg


Al Hydrox/Mg Hydrox/Simethicone (Maalox Plus 30 Ml)  30 ml PO Q6 PRN


   PRN Reason: Indigestion / Heartburn


   Last Admin: 05/18/18 21:52 Dose:  30 ml


Alprazolam (Xanax)  0.25 mg PO TID PRN


   PRN Reason: Anxiety


   Last Admin: 05/17/18 23:32 Dose:  0.25 mg


Aspirin (Aspirin Chewable)  81 mg PO DAILY [facility]


   Last Admin: 05/18/18 09:00 Dose:  81 mg


Atorvastatin Calcium (Lipitor)  40 mg PO HS [facility]


   Last Admin: 05/18/18 21:50 Dose:  40 mg


Clotrimazole (Lotrimin 1% Cream)  1 applic TOP 0600,1800 [facility]


   Last Admin: 05/19/18 05:50 Dose:  1 applic


Docusate Sodium (Colace)  100 mg PO HS [facility]


   Last Admin: 05/18/18 21:50 Dose:  100 mg


Enoxaparin Sodium (Lovenox)  40 mg SC DAILY [facility]


   PRN Reason: Protocol


   Last Admin: 05/19/18 09:00 Dose:  40 mg


Hydralazine HCl (Apresoline)  50 mg PO Q8 [facility]


   Last Admin: 05/19/18 05:49 Dose:  50 mg


Insulin Human Lispro (Humalog)  0 units SC ACHS [facility]


   PRN Reason: Protocol


   Last Admin: 05/19/18 06:40 Dose:  Not Given


Lactulose (Enulose)  10 gm PO DAILY [facility]


   PRN Reason: Constipation


Levothyroxine Sodium (Synthroid)  25 mcg PO 0630 [facility]


   Last Admin: 05/19/18 05:50 Dose:  25 mcg


Losartan Potassium (Cozaar)  50 mg PO DAILY [facility]


   Last Admin: 05/19/18 10:05 Dose:  50 mg


Metformin HCl (Glucophage)  1,000 mg PO BIDWM [facility]


   Last Admin: 05/19/18 08:00 Dose:  1,000 mg


Metoprolol Tartrate (Lopressor)  100 mg PO Q12 [facility]


   Last Admin: 05/19/18 08:03 Dose:  100 mg


Nifedipine (Procardia Xl)  30 mg PO DAILY [facility]


   Last Admin: 05/19/18 10:04 Dose:  30 mg


Pantoprazole Sodium (Protonix Ec Tab)  40 mg PO 0630 [facility]


   Last Admin: 05/19/18 06:38 Dose:  40 mg


Sitagliptin Phosphate (Januvia)  100 mg PO DAILY [facility]


   Last Admin: 05/19/18 09:00 Dose:  100 mg


Tamsulosin HCl (Flomax)  0.4 mg PO HS [facility]


   Last Admin: 05/18/18 21:51 Dose:  0.4 mg


Tolvaptan (Samsca)  15 mg PO DAILY [facility]


   Last Admin: 05/19/18 10:01 Dose:  15 mg











- Labs


Labs: 


 





 05/19/18 06:30 





 05/19/18 06:30

## 2018-05-20 LAB
ALBUMIN SERPL-MCNC: 3.5 G/DL (ref 3.5–5)
ALBUMIN/GLOB SERPL: 1.3 {RATIO} (ref 1–2.1)
ALT SERPL-CCNC: 44 U/L (ref 9–52)
AST SERPL-CCNC: 34 U/L (ref 14–36)
BUN SERPL-MCNC: 28 MG/DL (ref 7–17)
CALCIUM SERPL-MCNC: 9.1 MG/DL (ref 8.4–10.2)
ERYTHROCYTE [DISTWIDTH] IN BLOOD BY AUTOMATED COUNT: 14.1 % (ref 11.5–14.5)
GFR NON-AFRICAN AMERICAN: 53
HGB BLD-MCNC: 10.5 G/DL (ref 12–16)
MCH RBC QN AUTO: 30.6 PG (ref 27–31)
MCHC RBC AUTO-ENTMCNC: 33.5 G/DL (ref 33–37)
MCV RBC AUTO: 91.4 FL (ref 81–99)
PLATELET # BLD: 331 K/UL (ref 130–400)
RBC # BLD AUTO: 3.44 MIL/UL (ref 3.8–5.2)
WBC # BLD AUTO: 7.9 K/UL (ref 4.8–10.8)

## 2018-05-20 RX ADMIN — ENOXAPARIN SODIUM SCH MG: 40 INJECTION SUBCUTANEOUS at 09:02

## 2018-05-20 RX ADMIN — INSULIN LISPRO SCH: 100 INJECTION, SOLUTION INTRAVENOUS; SUBCUTANEOUS at 17:22

## 2018-05-20 RX ADMIN — ALUMINUM HYDROXIDE, MAGNESIUM HYDROXIDE, AND SIMETHICONE PRN ML: 200; 200; 20 SUSPENSION ORAL at 00:28

## 2018-05-20 RX ADMIN — PANTOPRAZOLE SODIUM SCH MG: 40 TABLET, DELAYED RELEASE ORAL at 05:50

## 2018-05-20 RX ADMIN — INSULIN LISPRO SCH: 100 INJECTION, SOLUTION INTRAVENOUS; SUBCUTANEOUS at 21:45

## 2018-05-20 RX ADMIN — INSULIN LISPRO SCH: 100 INJECTION, SOLUTION INTRAVENOUS; SUBCUTANEOUS at 12:36

## 2018-05-20 RX ADMIN — ALUMINUM HYDROXIDE, MAGNESIUM HYDROXIDE, AND SIMETHICONE PRN ML: 200; 200; 20 SUSPENSION ORAL at 23:24

## 2018-05-20 RX ADMIN — NIFEDIPINE SCH MG: 30 TABLET, FILM COATED, EXTENDED RELEASE ORAL at 09:02

## 2018-05-20 RX ADMIN — INSULIN LISPRO SCH: 100 INJECTION, SOLUTION INTRAVENOUS; SUBCUTANEOUS at 07:13

## 2018-05-20 NOTE — PN
DATE:  05/20/2018



SUBJECTIVE:  The patient is seen and examined.  Interim events noted. 

Consults noted and appreciated.  Nephrology and Neurology followup and

interventions noted and appreciated.  The patient remains in _____ Unit. 

The patient is sleeping, arousable, and denies any complaints.  No specific

issue reported by nursing staff.



PHYSICAL EXAMINATION:

GENERAL:  The patient is in no acute distress.

VITAL SIGNS:  Stable.

HEART:  S1 and S2, normal and regular.

LUNGS:  Good bilateral air exchange.

ABDOMEN:  Soft and nontender.

EXTREMITIES:  No edema, no calf swelling.  No tenderness.  No acute

ischemia.

CENTRAL NERVOUS SYSTEM:  Essentially unchanged.



DIAGNOSTIC DATA:  Available diagnostic data reviewed.



PLAN:  Overall, the patient's general medical condition is stable.  Sodium

level is 136 and is controlled.  Plan as ordered.





__________________________________________

Enrique Kelly MD





DD:  05/20/2018 7:29:21

DT:  05/20/2018 9:47:24

Job # [id number]

## 2018-05-21 LAB
ALBUMIN SERPL-MCNC: 3.7 G/DL (ref 3.5–5)
ALBUMIN/GLOB SERPL: 1.1 {RATIO} (ref 1–2.1)
ALT SERPL-CCNC: 54 U/L (ref 9–52)
AST SERPL-CCNC: 34 U/L (ref 14–36)
BUN SERPL-MCNC: 28 MG/DL (ref 7–17)
CALCIUM SERPL-MCNC: 9.3 MG/DL (ref 8.4–10.2)
ERYTHROCYTE [DISTWIDTH] IN BLOOD BY AUTOMATED COUNT: 14.2 % (ref 11.5–14.5)
GFR NON-AFRICAN AMERICAN: > 60
HGB BLD-MCNC: 10.6 G/DL (ref 12–16)
MCH RBC QN AUTO: 30.7 PG (ref 27–31)
MCHC RBC AUTO-ENTMCNC: 33.7 G/DL (ref 33–37)
MCV RBC AUTO: 91.3 FL (ref 81–99)
PLATELET # BLD: 359 K/UL (ref 130–400)
RBC # BLD AUTO: 3.46 MIL/UL (ref 3.8–5.2)
WBC # BLD AUTO: 5.3 K/UL (ref 4.8–10.8)

## 2018-05-21 RX ADMIN — INSULIN LISPRO SCH: 100 INJECTION, SOLUTION INTRAVENOUS; SUBCUTANEOUS at 16:47

## 2018-05-21 RX ADMIN — ENOXAPARIN SODIUM SCH MG: 40 INJECTION SUBCUTANEOUS at 09:06

## 2018-05-21 RX ADMIN — INSULIN LISPRO SCH: 100 INJECTION, SOLUTION INTRAVENOUS; SUBCUTANEOUS at 06:52

## 2018-05-21 RX ADMIN — PANTOPRAZOLE SODIUM SCH MG: 40 TABLET, DELAYED RELEASE ORAL at 05:40

## 2018-05-21 RX ADMIN — NIFEDIPINE SCH MG: 30 TABLET, FILM COATED, EXTENDED RELEASE ORAL at 09:07

## 2018-05-21 NOTE — PN
DATE:  05/18/2018



SUBJECTIVE:  The patient is seen and examined.  Interim events noted. 

Consults noted and appreciated.  Urology and Nephrology consults and

intervention noted and appreciated.  The patient's sodium was low and the

patient _____ again.  The patient is sleeping, arousable, and feels okay. 

Denies any specific complaints.  No chest pain or shortness of breath.



PHYSICAL EXAMINATION:

GENERAL:  The patient is in no acute distress.

VITAL SIGNS:  Stable.

HEART:  S1 and S2 normal and regular.

LUNGS:  Good bilateral air exchange.

ABDOMEN:  Soft and nontender.

EXTREMITIES:  No edema.  No calf swelling.  No tenderness.  No acute

ischemia.

CENTRAL NERVOUS SYSTEM:  Exam is essentially unchanged.



DIAGNOSTIC DATA:  Available diagnostic data reviewed, as mentioned earlier

sodium level was 127.



ASSESSMENT AND PLAN:  Overall, the patient's general medical condition is

stable.  Plan as ordered.



ADT not auto populated, verify PAN number.



__________________________________________

Enrique Kelly MD





DD:  05/18/2018 7:46:55

DT:  05/18/2018 8:42:33

Job # [id number]

## 2018-05-21 NOTE — CP.PCM.PN
Subjective





- Date & Time of Evaluation


Date of Evaluation: 05/21/18


Time of Evaluation: 09:32





- Subjective


Subjective: 





Follow up Nephrology Consultation Note





Assessment: Stable


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


DM, HTN, hx of breast CA





Plan


BP controlled on meds as ordered


continue daily samsca, na is stable


Glycemic control


Further work up for as per primary team


Consider workup on her status of breast CA later on as outpt.











Subjective:  seen as examined denies n/v





Physical Examination: 


General Appearance: Comfortable, in no acute respiratory distress, co-operative 

. 


Vitals reviewed and noted as below


Head; Atraumatic, normocephalic


ENT: no ulcers no thrush. Tongue is midline. Oropharynx: no rash or ulcers.


EYES: Pupils are equal, round and Eye muscles and extraocular movement intact. 

Sclera is anicteric.


Neck; supple no lymphadenopathy, no thyromegaly or bruit


Lungs: Normal respiratory rate/effort. Breath sounds bilateral equal and clear


Heart: Normal rate. s1s2 normal. No rub or gallop. 


Extremities: no edema. No varicose veins


Neurological: Patient is alert, awake and oriented to person, place and time. 

has rt side weakness and slurred speech


Skin: Warm and dry. Normal turgor. No rash. Palpitation: Normal elasticity for 

age


Abdomen: Abdomen is soft. Bowel sounds +. There is no abdominal tenderness, no 

guarding/rigidity no organomegaly


Psych: normal insight and normal affect/mood


MSK: no joint tenderness or swelling. Digits and nails normal, no deformity


: kidney or bladder not palpable





Labs/imaging reviewed.


Past medical history, past surgical history, family history, social history, 

allergy reviewed and noted as below


Family hx: no hx of CKD. Rest non-contributory 








Objective





- Vital Signs/Intake and Output


Vital Signs (last 24 hours): 


 











Temp Pulse Resp BP Pulse Ox


 


 98.1 F   68   19   120/68   98 


 


 05/21/18 07:42  05/21/18 09:07  05/21/18 07:42  05/21/18 09:07  05/21/18 07:42











- Medications


Medications: 


 Current Medications





Acetaminophen (Tylenol 325mg Tab)  325 mg PO Q6 PRN


   PRN Reason: Pain 1-10


   Last Admin: 05/18/18 17:35 Dose:  325 mg


Al Hydrox/Mg Hydrox/Simethicone (Maalox Plus 30 Ml)  30 ml PO Q6 PRN


   PRN Reason: Indigestion / Heartburn


   Last Admin: 05/20/18 23:24 Dose:  30 ml


Alprazolam (Xanax)  0.25 mg PO TID PRN


   PRN Reason: Anxiety


   Last Admin: 05/20/18 23:24 Dose:  0.25 mg


Aspirin (Aspirin Chewable)  81 mg PO DAILY [facility]


   Last Admin: 05/21/18 09:03 Dose:  81 mg


Atorvastatin Calcium (Lipitor)  40 mg PO HS [facility]


   Last Admin: 05/20/18 21:47 Dose:  40 mg


Ciprofloxacin (Cipro)  500 mg PO Q12 [facility]


   PRN Reason: Protocol


   Stop: 05/25/18 09:00


   Last Admin: 05/21/18 09:04 Dose:  500 mg


Clotrimazole (Lotrimin 1% Cream)  1 applic TOP 0600,1800 [facility]


   Last Admin: 05/21/18 05:40 Dose:  1 applic


Docusate Sodium (Colace)  100 mg PO HS [facility]


   Last Admin: 05/20/18 21:48 Dose:  Not Given


Enoxaparin Sodium (Lovenox)  40 mg SC DAILY [facility]


   PRN Reason: Protocol


   Last Admin: 05/21/18 09:06 Dose:  40 mg


Hydralazine HCl (Apresoline)  50 mg PO Q8 [facility]


   Last Admin: 05/21/18 05:40 Dose:  50 mg


Insulin Human Lispro (Humalog)  0 units SC ACHS [facility]


   PRN Reason: Protocol


   Last Admin: 05/21/18 06:52 Dose:  Not Given


Lactulose (Enulose)  10 gm PO DAILY PRN


   PRN Reason: Constipation


Levothyroxine Sodium (Synthroid)  25 mcg PO 0630 [facility]


   Last Admin: 05/21/18 05:40 Dose:  25 mcg


Losartan Potassium (Cozaar)  50 mg PO DAILY [facility]


   Last Admin: 05/21/18 09:04 Dose:  50 mg


Metformin HCl (Glucophage)  1,000 mg PO BIDWM [facility]


   Last Admin: 05/21/18 09:05 Dose:  1,000 mg


Metoprolol Tartrate (Lopressor)  100 mg PO Q12 [facility]


   Last Admin: 05/21/18 09:05 Dose:  100 mg


Nifedipine (Procardia Xl)  30 mg PO DAILY [facility]


   Last Admin: 05/21/18 09:07 Dose:  30 mg


Pantoprazole Sodium (Protonix Ec Tab)  40 mg PO 0630 [facility]


   Last Admin: 05/21/18 05:40 Dose:  40 mg


Sitagliptin Phosphate (Januvia)  100 mg PO DAILY [facility]


   Last Admin: 05/21/18 09:03 Dose:  100 mg


Tamsulosin HCl (Flomax)  0.4 mg PO HS [facility]


   Last Admin: 05/20/18 21:48 Dose:  0.4 mg


Tolvaptan (Samsca)  15 mg PO DAILY [facility]


   Last Admin: 05/21/18 09:03 Dose:  15 mg











- Labs


Labs: 


 





 05/21/18 05:45 





 05/21/18 05:45

## 2018-05-22 RX ADMIN — PANTOPRAZOLE SODIUM SCH MG: 40 TABLET, DELAYED RELEASE ORAL at 05:43

## 2018-05-22 RX ADMIN — NIFEDIPINE SCH MG: 30 TABLET, FILM COATED, EXTENDED RELEASE ORAL at 09:08

## 2018-05-22 RX ADMIN — ALUMINUM HYDROXIDE, MAGNESIUM HYDROXIDE, AND SIMETHICONE PRN ML: 200; 200; 20 SUSPENSION ORAL at 23:19

## 2018-05-22 RX ADMIN — INSULIN LISPRO SCH: 100 INJECTION, SOLUTION INTRAVENOUS; SUBCUTANEOUS at 17:25

## 2018-05-22 RX ADMIN — INSULIN LISPRO SCH: 100 INJECTION, SOLUTION INTRAVENOUS; SUBCUTANEOUS at 06:19

## 2018-05-22 RX ADMIN — ENOXAPARIN SODIUM SCH MG: 40 INJECTION SUBCUTANEOUS at 09:07

## 2018-05-22 NOTE — PN
DATE:  05/21/2018



SUBJECTIVE:  The patient is seen and examined.  Interim events noted. 

Consults noted and appreciated.  The patient remains in Acute Rehab Unit. 

Awake and responsive.  Still complains of not being able to sleep well,

although according to nursing staff, the patient _____.  No chest pain or

shortness of breath.



PHYSICAL EXAMINATION:

GENERAL:  The patient is in no acute distress.

VITAL SIGNS:  Stable.

HEART:  S1 and S2, normal and regular.

LUNGS:  Good bilateral air exchange.

ABDOMEN:  Soft and nontender.

EXTREMITIES:  No edema.  No calf swelling.  No tenderness.  No acute

ischemia.

CENTRAL NERVOUS SYSTEM:  Exam is essentially unchanged.



DIAGNOSTIC DATA:  Available diagnostic data reviewed.



ASSESSMENT AND PLAN:  Overall, the patient's general medical condition is

stable.  Consults noted and appreciated.  Plan as ordered.





__________________________________________

Enrique Kelly MD





DD:  05/21/2018 8:35:47

DT:  05/21/2018 8:53:11

Job # [id number]

## 2018-05-22 NOTE — CP.PCM.PN
Subjective





- Date & Time of Evaluation


Date of Evaluation: 05/22/18


Time of Evaluation: 11:40





- Subjective


Subjective: 





no acute complaints





Objective





- Vital Signs/Intake and Output


Vital Signs (last 24 hours): 


 











Temp Pulse Resp BP Pulse Ox


 


 97.3 F L  70   22   124/63   98 


 


 05/22/18 10:00  05/22/18 13:43  05/22/18 10:00  05/22/18 13:43  05/22/18 10:00











- Medications


Medications: 


 Current Medications





Acetaminophen (Tylenol 325mg Tab)  325 mg PO Q6 PRN


   PRN Reason: Pain 1-10


   Last Admin: 05/18/18 17:35 Dose:  325 mg


Al Hydrox/Mg Hydrox/Simethicone (Maalox Plus 30 Ml)  30 ml PO Q6 PRN


   PRN Reason: Indigestion / Heartburn


   Last Admin: 05/20/18 23:24 Dose:  30 ml


Alprazolam (Xanax)  0.25 mg PO TID PRN


   PRN Reason: Anxiety


   Last Admin: 05/22/18 00:24 Dose:  0.25 mg


Aspirin (Aspirin Chewable)  81 mg PO DAILY [facility]


   Last Admin: 05/22/18 09:07 Dose:  81 mg


Atorvastatin Calcium (Lipitor)  40 mg PO HS [facility]


   Last Admin: 05/21/18 21:40 Dose:  40 mg


Clotrimazole (Lotrimin 1% Cream)  1 applic TOP 0600,1800 [facility]


   Last Admin: 05/22/18 05:43 Dose:  1 applic


Docusate Sodium (Colace)  100 mg PO HS [facility]


   Last Admin: 05/21/18 21:41 Dose:  100 mg


Enoxaparin Sodium (Lovenox)  40 mg SC DAILY [facility]


   PRN Reason: Protocol


   Last Admin: 05/22/18 09:07 Dose:  40 mg


Hydralazine HCl (Apresoline)  50 mg PO Q8 [facility]


   Last Admin: 05/22/18 13:43 Dose:  50 mg


Insulin Human Lispro (Humalog)  0 units SC 0630,1630 [facility]


   PRN Reason: Protocol


   Last Admin: 05/22/18 06:19 Dose:  Not Given


Lactulose (Enulose)  10 gm PO DAILY PRN


   PRN Reason: Constipation


Levothyroxine Sodium (Synthroid)  25 mcg PO 0630 [facility]


   Last Admin: 05/22/18 05:43 Dose:  25 mcg


Losartan Potassium (Cozaar)  50 mg PO DAILY [facility]


   Last Admin: 05/22/18 09:09 Dose:  50 mg


Metformin HCl (Glucophage)  1,000 mg PO BIDWM [facility]


   Last Admin: 05/22/18 09:06 Dose:  1,000 mg


Metoprolol Tartrate (Lopressor)  100 mg PO Q12 [facility]


   Last Admin: 05/22/18 09:08 Dose:  100 mg


Nifedipine (Procardia Xl)  30 mg PO DAILY [facility]


   Last Admin: 05/22/18 09:08 Dose:  30 mg


Pantoprazole Sodium (Protonix Ec Tab)  40 mg PO 0630 [facility]


   Last Admin: 05/22/18 05:43 Dose:  40 mg


Sitagliptin Phosphate (Januvia)  100 mg PO DAILY [facility]


   Last Admin: 05/22/18 09:08 Dose:  100 mg


Tamsulosin HCl (Flomax)  0.4 mg PO HS [facility]


   Last Admin: 05/21/18 21:40 Dose:  0.4 mg


Tolvaptan (Samsca)  15 mg PO DAILY [facility]


   Last Admin: 05/22/18 09:09 Dose:  15 mg











- Labs


Labs: 


 





 05/21/18 05:45 





 05/21/18 05:45 











- Head Exam


Head Exam: ATRAUMATIC, NORMAL INSPECTION, NORMOCEPHALIC





- Eye Exam


Eye Exam: EOMI, Normal appearance, PERRL


Pupil Exam: NORMAL ACCOMODATION





- ENT Exam


ENT Exam: Mucous Membranes Moist, Normal Exam





- Neck Exam


Neck Exam: Full ROM, Normal Inspection





- Respiratory Exam


Respiratory Exam: Clear to Ausculation Bilateral, NORMAL BREATHING PATTERN





- Cardiovascular Exam


Cardiovascular Exam: REGULAR RHYTHM





- GI/Abdominal Exam


GI & Abdominal Exam: Soft, Normal Bowel Sounds





- Rectal Exam


Rectal Exam: NORMAL INSPECTION





-  Exam


External exam: NORMAL EXTERNAL EXAM





- Extremities Exam


Extremities Exam: Normal Inspection





- Back Exam


Back Exam: NORMAL INSPECTION





- Neurological Exam


Neurological Exam: Alert, Awake


Neuro motor strength exam: Left Upper Extremity: 3, Right Upper Extremity: 2/1, 

Left Lower Extremity: 3, Right Lower Extremity: 2/1





- Psychiatric Exam


Psychiatric exam: Normal Affect, Normal Mood





- Skin


Skin Exam: Dry, Intact





Assessment and Plan


(1) CVA (cerebral vascular accident)


Assessment & Plan: 


plan for pt, ot rec and speech therapy


Status: Acute   





(2) Hyponatremia


Status: Acute   





(3) Anxiety


Status: Acute   





(4) Benign hypertension


Status: Acute   





(5) HTN (hypertension)


Status: Acute

## 2018-05-22 NOTE — CP.PCM.PN
Subjective





- Date & Time of Evaluation


Date of Evaluation: 05/22/18


Time of Evaluation: 13:20





- Subjective


Subjective: 





Patient is out of bed sitting in the chair awake and consciousness no nausea no 

vomiting was good appetite





Objective





- Vital Signs/Intake and Output


Vital Signs (last 24 hours): 


 











Temp Pulse Resp BP Pulse Ox


 


 97.3 F L  70   22   133/69   98 


 


 05/22/18 10:00  05/22/18 10:00  05/22/18 10:00  05/22/18 10:00  05/22/18 10:00











- Medications


Medications: 


 Current Medications





Acetaminophen (Tylenol 325mg Tab)  325 mg PO Q6 PRN


   PRN Reason: Pain 1-10


   Last Admin: 05/18/18 17:35 Dose:  325 mg


Al Hydrox/Mg Hydrox/Simethicone (Maalox Plus 30 Ml)  30 ml PO Q6 PRN


   PRN Reason: Indigestion / Heartburn


   Last Admin: 05/20/18 23:24 Dose:  30 ml


Alprazolam (Xanax)  0.25 mg PO TID PRN


   PRN Reason: Anxiety


   Last Admin: 05/22/18 00:24 Dose:  0.25 mg


Aspirin (Aspirin Chewable)  81 mg PO DAILY [facility]


   Last Admin: 05/22/18 09:07 Dose:  81 mg


Atorvastatin Calcium (Lipitor)  40 mg PO HS [facility]


   Last Admin: 05/21/18 21:40 Dose:  40 mg


Clotrimazole (Lotrimin 1% Cream)  1 applic TOP 0600,1800 [facility]


   Last Admin: 05/22/18 05:43 Dose:  1 applic


Docusate Sodium (Colace)  100 mg PO HS [facility]


   Last Admin: 05/21/18 21:41 Dose:  100 mg


Enoxaparin Sodium (Lovenox)  40 mg SC DAILY [facility]


   PRN Reason: Protocol


   Last Admin: 05/22/18 09:07 Dose:  40 mg


Hydralazine HCl (Apresoline)  50 mg PO Q8 [facility]


   Last Admin: 05/22/18 05:42 Dose:  50 mg


Insulin Human Lispro (Humalog)  0 units SC 0630,1630 [facility]


   PRN Reason: Protocol


   Last Admin: 05/22/18 06:19 Dose:  Not Given


Lactulose (Enulose)  10 gm PO DAILY PRN


   PRN Reason: Constipation


Levothyroxine Sodium (Synthroid)  25 mcg PO 0630 [facility]


   Last Admin: 05/22/18 05:43 Dose:  25 mcg


Losartan Potassium (Cozaar)  50 mg PO DAILY [facility]


   Last Admin: 05/22/18 09:09 Dose:  50 mg


Metformin HCl (Glucophage)  1,000 mg PO BIDWM [facility]


   Last Admin: 05/22/18 09:06 Dose:  1,000 mg


Metoprolol Tartrate (Lopressor)  100 mg PO Q12 [facility]


   Last Admin: 05/22/18 09:08 Dose:  100 mg


Nifedipine (Procardia Xl)  30 mg PO DAILY [facility]


   Last Admin: 05/22/18 09:08 Dose:  30 mg


Pantoprazole Sodium (Protonix Ec Tab)  40 mg PO 0630 [facility]


   Last Admin: 05/22/18 05:43 Dose:  40 mg


Sitagliptin Phosphate (Januvia)  100 mg PO DAILY [facility]


   Last Admin: 05/22/18 09:08 Dose:  100 mg


Tamsulosin HCl (Flomax)  0.4 mg PO HS [facility]


   Last Admin: 05/21/18 21:40 Dose:  0.4 mg


Tolvaptan (Samsca)  15 mg PO DAILY [facility]


   Last Admin: 05/22/18 09:09 Dose:  15 mg











- Labs


Labs: 


 





 05/21/18 05:45 





 05/21/18 05:45 











- Constitutional


Appears: No Acute Distress





- ENT Exam


ENT Exam: Mucous Membranes Moist





- Respiratory Exam


Respiratory Exam: NORMAL BREATHING PATTERN





- Cardiovascular Exam


Cardiovascular Exam: REGULAR RHYTHM.  absent: Rubs





- GI/Abdominal Exam


GI & Abdominal Exam: Soft, Normal Bowel Sounds





- Extremities Exam


Extremities Exam: absent: Calf Tenderness





- Back Exam


Back Exam: absent: CVA tenderness (L), CVA tenderness (R)





- Neurological Exam


Neurological Exam: Alert





- Psychiatric Exam


Psychiatric exam: Anxious





- Skin


Skin Exam: absent: Cyanosis





Assessment and Plan


(1) Hyponatremia


Assessment & Plan: 


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


DM, HTN, hx of breast CA





Plan


serum sodium 135


BP controlled on meds as ordered


continue daily samsca, na is stable


Glycemic control


Further work up for as per primary team


Consider workup on her status of breast CA later on as outpt.


repeat serum sodium tomorrow





Status: Acute

## 2018-05-22 NOTE — PN
DATE:  05/22/2018



SUBJECTIVE:  The patient is seen and examined.  Interim events noted.  The

patient remains in Acute Rehab Unit.  Nephrology followup and interventions

noted and appreciated.  The patient feels okay, still complains of not able

to sleep, but according to nursing staff, the patient sleeps adequately. 

No chest pain or shortness of breath.



OBJECTIVE:

GENERAL:  The patient is in no acute distress.

VITAL SIGNS:  Stable.

HEART:  S1 and S2, normal and regular.

LUNGS:  Good bilateral air exchange.

ABDOMEN:  Soft and nontender.

EXTREMITIES:  No edema, no calf swelling.  No tenderness.  No acute

ischemia.

CENTRAL NERVOUS SYSTEM:  Essentially unchanged.



DIAGNOSTIC DATA:  Available diagnostic data reviewed.



PLAN:  Overall, the patient's general medical condition is stable.  Plan as

ordered.





__________________________________________

Enrique Kelly MD





DD:  05/22/2018 8:52:24

DT:  05/22/2018 8:53:27

Job # [id number]

## 2018-05-23 LAB
BUN SERPL-MCNC: 32 MG/DL (ref 7–17)
CALCIUM SERPL-MCNC: 9.4 MG/DL (ref 8.4–10.2)
GFR NON-AFRICAN AMERICAN: 53

## 2018-05-23 RX ADMIN — INSULIN LISPRO SCH: 100 INJECTION, SOLUTION INTRAVENOUS; SUBCUTANEOUS at 17:33

## 2018-05-23 RX ADMIN — ENOXAPARIN SODIUM SCH MG: 40 INJECTION SUBCUTANEOUS at 09:43

## 2018-05-23 RX ADMIN — INSULIN LISPRO SCH: 100 INJECTION, SOLUTION INTRAVENOUS; SUBCUTANEOUS at 05:37

## 2018-05-23 RX ADMIN — NIFEDIPINE SCH MG: 30 TABLET, FILM COATED, EXTENDED RELEASE ORAL at 09:45

## 2018-05-23 RX ADMIN — PANTOPRAZOLE SODIUM SCH MG: 40 TABLET, DELAYED RELEASE ORAL at 05:36

## 2018-05-23 NOTE — PSY.TMCNF
Nursing





- Vital Signs


Vital Signs (Last 8 hours): 


 Vital Signs











  05/23/18 05/23/18 05/23/18





  05:25 09:44 09:45


 


Temperature   


 


Pulse Rate 70 78 78


 


Respiratory   





Rate   


 


Blood Pressure 126/65 128/64 127/64


 


O2 Sat by Pulse   





Oximetry   














  05/23/18





  10:00


 


Temperature 98.0 F


 


Pulse Rate 78


 


Respiratory 22





Rate 


 


Blood Pressure 127/64


 


O2 Sat by Pulse 97





Oximetry 











Pain: 0





- Precautions:


Precautions: Fall Prevention, Aspiration, Pressure Ulcer





- Medications/Other Issues


Comment: Pt at moderate nutritional risk.  goals: 1.  Pt to consume % of 

meals(met, continue).  2.  Blood glucoses to be between  mg/dl(met, 

continue).  Follow-up due on 05/24/2018





- Consults


Comment: Dr. Guzman, Dr. Simpson-Cholo





- Toileting


Toileting: Dependent





- Bladder Management


Bladder Pattern: Normal





- Bowel Management


Bowel Pattern: Incontinent


Bowel Management: Dependent


Frequency of Accidents: >5





- Transfers


Transfers: Maximal Assistance





- ADL's


ADL's: Maximal Assistance





- Pain Management


Comments: denies pain





- Patient/Family Teaching


Comments: Care post CVA and safety precautions





- Goals/Time Frame


Comments: Per multidisciplinary care plan and goals





- Provider


Provider: Mei LRN RN CRRN





Physical Therapy





- Bed Mobility


Bed Mobility: Moderate Assistance





- Transfers


Wheelchair to Mat: Verbal Cues, Minimal Assistance


Sit to Stand: Verbal Cues, Minimal Assistance





- Ambulation


Level of Assistance: Verbal Cues, Minimal Assistance, Moderate Assistance


Distance (ft.): 50


Assistive Devices: Wide base quad cane


Orthoses: RLE dorsiflexor assist ACE wrap





- Stair Negotiation


Stairs: Level of Assistance: Not Tested





- Standing Balance


Static Stand: Minimal Assistance


Dynamic Stand: Moderate Assistance





- Pain


Pain (assessed during therapy session): 0


Comment: no reports of pain





- Insight/Carryover


Insight/Carryover: Good





- Patient/Family Education


Comment: CVA recovery, safety, HEP





- Assessment/Plan


Assessment: Pt continues to demonstrate improvements in functional mobility 

skills; as pt progressed to ambulating 50 ft with WBQC and min/mod A. Pt highly 

motivated during sessions and will continue to benefit from skilled PT 

interventions to address deficits, reduce fall risk, and maximzie functional 

independence. Recommend d/c to [facility] to continue to progress towards rehab goals





- Goals


Timeframe: 3 weeks


Goals: SIt < > supine CGA.  Sit < > stand CGA with WBQC.  Pt will ambulate 150 

ft with WBQC and CGA





- Provider


Therapist: Loretta Treadwell PT DPT


License Number: [id number]





Occupational Therapy





- Arousal/Attention/Orientation


Patient Orientation: Person, Place, Time, Appropriate to Age, Appropriate to 

Situation





- ADL/IADL


Self Feeding: Independent, Set-up Help


Grooming: Verbal Cues, Contact Guard, Minimal Assistance


Bathing-Upper Extremity: Verbal Cues, Set-up Help, Moderate Assistance


Bathing-Lower Extremity: Verbal Cues, Set-up Help, Maximum Assistance


Dressing-Upper Extremity: Verbal Cues, Set-up Help, Moderate Assistance, 

Maximum Assistance


Dressing-Lower Extremity: Verbal Cues, Set-up Help, Maximum Assistance


Comment: needs reminders of adaptive/compensatory strategies





- Sitting Balance


Static Sitting: Supervision


Dynamic Sitting: Contact Guard Assist, Minimal Assistance


Comment: unsupported at edge of bed





- Transfers


Wheelchair to Bed Transfers: Verbal Cues, Set-up Help, Minimal Assistance, 

Moderate Assistance


Toilet Transfers: Verbal Cues, Set-up Help, Minimal Assistance, Moderate 

Assistance


Comment: moderate assist and verbal cues  w/c<->shower chair





- Wheelchair Management


Level of Assistance: Dependent





- Upper Extremity Status


Right Upper Extremity Comment: PROM: WFL.  MMT: increased isolation.  R shoulder

: 2/5.  R elbow flex: 2+/5 to 3-/5.  R foreram: 2-/5.  R wrsit flex/extension 2-

/5.  -mass grasp flexion: 1/2 range


Left Upper Extremity Comment: P/AROM: WFL.  MMT: 4/5





- Pain


Pain (assessed during therapy session): 0


Comment: no reports of pain





- Insight/Carryover


Insight/Carryover: Good





- Patient/Family Education


Comment: CVA recovery, safety, HEP





- Assessment/Plan


Assessment: Pt continues to demonstrate improvements in functional mobility 

skills; as pt progressed to ambulating 50 ft with WBQC and min/mod A. Pt highly 

motivated during sessions and will continue to benefit from skilled PT 

interventions to address deficits, reduce fall risk, and maximzie functional 

independence. Recommend d/c to [facility] to continue to progress towards rehab goals





- Goals


Timeframe: 3 weeks


Goals: SIt < > supine CGA.  Sit < > stand CGA with WBQC.  Pt will ambulate 150 

ft with WBQC and CGA





- Provider


Therapist: Mitra Almonte, OTR/L





Speech Therapy





- Consult Information


Patient on Program: Yes


Medical Diagnosis: CVA


Treatment Diagnosis: -MILD-MODERATE COGNITIVE-LINGUISTIC DEFICITS.  -MILD 

DYSARTHRIA.  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





- Assessment


Problem Solving Impairment: Moderate


Memory Impairment: Mild


Speech/Articulation Impairment: Mild





- Plan


Assessment: Pt continues to demonstrate improvements in functional mobility 

skills; as pt progressed to ambulating 50 ft with WBQC and min/mod A. Pt highly 

motivated during sessions and will continue to benefit from skilled PT 

interventions to address deficits, reduce fall risk, and maximzie functional 

independence. Recommend d/c to [facility] to continue to progress towards rehab goals





- Provider


Therapist: Sonali Melendez


License Number: [id number]





Recreational Therapy





- Participation


Participation: Participates in Individual and/or Group Sessions





- Attendance


Attendance: 3-5 times per week





- Activities


Leisure Activities: Cards and Games





- Socialization


Level of Socialization: Initiates/interacts freely with care givers and peer





- Diversional Time


Diversional Time: watching television, participates in recreation therapy 

sessions





- Assessment


Assessment/Plan: Pt continues to demonstrate improvements in functional 

mobility skills; as pt progressed to ambulating 50 ft with WBQC and min/mod A. 

Pt highly motivated during sessions and will continue to benefit from skilled 

PT interventions to address deficits, reduce fall risk, and maximzie functional 

independence. Recommend d/c to [facility] to continue to progress towards rehab goals





- Provider


Therapist: Maria Esther Newman, CTRS #[id number]





Nutrition





- Current Diet


Current Diet/ Supplement/ Feedings: Low consistent CHO mech altered(finely 

chopped) thin liquids





- Appetite


Percent Meal Consumed: %





- Comments


Comments: Care post CVA and safety precautions





- Assessment/Goals/Time Frame


Assessment/Goals/Time Frame: Pt at moderate nutritional risk.  goals: 1.  Pt to 

consume % of meals(met, continue).  2.  Blood glucoses to be between 70-

180 mg/dl(met, continue).  Follow-up due on 05/24/2018





- Provider


Provider: Mary Jane Londono RD





Case Management





- Psychosocial Assessment


Support Systems: Elissa Fernandez ([facility])- [phone].  [facility] ([facility])

- [phone]


Psychological Interventions/Needs: Patient is alert with mild cognitive 

impairments


Discharge Concerns: Patient with very little support at home, lives alone, 

currently requiring min-mod A overall for functional mobility, incontinent of 

bowel and bladder


Patient/Family Meeting: CM met with patient and rehab team


Intervention/Goal/Outcome:: 1. Plan: [facility] at [facility] as patient very 

concerned about discharge home as she has very little social support. 2. 

continued emotional support. 3. continued stay auth, LAD: 5/21, updates to be 

faxed at that time. 4. tentative discharge date: 6/1/2018





- Discharge Plan


Discharge Plan: Subacute care





- Provider


Provider: JAMES Alvarez, SUZIW


License Number: [id number]





Rehabilitation Plan





- Treatment Plan


Treatment Plan: Physical Therapy, Occupational Therapy, Speech, Dietary, Patient

/Family Education





- Recommendation


Recommendation: Physical Therapy, Occupational Therapy, Speech, Dietary, Patient

/Family Education





- Discharge Plan


Discharge to: Subacute

## 2018-05-23 NOTE — CP.PCM.PN
Subjective





- Date & Time of Evaluation


Date of Evaluation: 05/23/18


Time of Evaluation: 15:33





- Subjective


Subjective: 





Patient appears to be stable no nausea no vomiting





Objective





- Vital Signs/Intake and Output


Vital Signs (last 24 hours): 


 











Temp Pulse Resp BP Pulse Ox


 


 98.0 F   77   22   128/68   97 


 


 05/23/18 10:00  05/23/18 13:02  05/23/18 10:00  05/23/18 13:02  05/23/18 10:00











- Medications


Medications: 


 Current Medications





Acetaminophen (Tylenol 325mg Tab)  325 mg PO Q6 PRN


   PRN Reason: Pain 1-10


   Last Admin: 05/18/18 17:35 Dose:  325 mg


Al Hydrox/Mg Hydrox/Simethicone (Maalox Plus 30 Ml)  30 ml PO Q6 PRN


   PRN Reason: Indigestion / Heartburn


   Last Admin: 05/22/18 23:19 Dose:  30 ml


Alprazolam (Xanax)  0.25 mg PO TID PRN


   PRN Reason: Anxiety


   Last Admin: 05/22/18 00:24 Dose:  0.25 mg


Aspirin (Aspirin Chewable)  81 mg PO DAILY [facility]


   Last Admin: 05/23/18 09:44 Dose:  81 mg


Atorvastatin Calcium (Lipitor)  40 mg PO HS [facility]


   Last Admin: 05/22/18 21:25 Dose:  40 mg


Clotrimazole (Lotrimin 1% Cream)  1 applic TOP 0600,1800 [facility]


   Last Admin: 05/23/18 05:19 Dose:  1 applic


Docusate Sodium (Colace)  100 mg PO HS [facility]


   Last Admin: 05/22/18 21:26 Dose:  100 mg


Enoxaparin Sodium (Lovenox)  40 mg SC DAILY [facility]


   PRN Reason: Protocol


   Last Admin: 05/23/18 09:43 Dose:  40 mg


Hydralazine HCl (Apresoline)  50 mg PO Q8 [facility]


   Last Admin: 05/23/18 13:02 Dose:  50 mg


Insulin Human Lispro (Humalog)  0 units SC 0630,1630 [facility]


   PRN Reason: Protocol


   Last Admin: 05/23/18 05:37 Dose:  Not Given


Lactulose (Enulose)  10 gm PO DAILY PRN


   PRN Reason: Constipation


Levothyroxine Sodium (Synthroid)  25 mcg PO 0630 [facility]


   Last Admin: 05/23/18 05:36 Dose:  25 mcg


Losartan Potassium (Cozaar)  50 mg PO DAILY [facility]


   Last Admin: 05/23/18 09:45 Dose:  50 mg


Metformin HCl (Glucophage)  1,000 mg PO BIDWM [facility]


   Last Admin: 05/23/18 09:44 Dose:  1,000 mg


Metoprolol Tartrate (Lopressor)  100 mg PO Q12 [facility]


   Last Admin: 05/23/18 09:44 Dose:  100 mg


Nifedipine (Procardia Xl)  30 mg PO DAILY [facility]


   Last Admin: 05/23/18 09:45 Dose:  30 mg


Pantoprazole Sodium (Protonix Ec Tab)  40 mg PO 0630 [facility]


   Last Admin: 05/23/18 05:36 Dose:  40 mg


Sitagliptin Phosphate (Januvia)  100 mg PO DAILY [facility]


   Last Admin: 05/23/18 09:44 Dose:  100 mg


Tamsulosin HCl (Flomax)  0.4 mg PO HS [facility]


   Last Admin: 05/22/18 21:27 Dose:  0.4 mg


Tolvaptan (Samsca)  15 mg PO DAILY [facility]


   Last Admin: 05/23/18 09:45 Dose:  15 mg











- Labs


Labs: 


 





 05/21/18 05:45 





 05/23/18 05:25 











- Constitutional


Appears: No Acute Distress





- ENT Exam


ENT Exam: Mucous Membranes Moist





- Respiratory Exam


Respiratory Exam: absent: Chest Wall Tenderness





- GI/Abdominal Exam


GI & Abdominal Exam: absent: Guarding





- Back Exam


Back Exam: absent: CVA tenderness (L), CVA tenderness (R)





- Neurological Exam


Neurological Exam: Alert





Assessment and Plan


(1) Hyponatremia


Assessment & Plan: 


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


DM, HTN, hx of breast CA





Plan


serum sodium remained 135


BP controlled on meds as ordered


continue daily samsca, na is stable


Glycemic control


Further work up for as per primary team


Consider workup on her status of breast CA later on as outpt.


Status: Acute

## 2018-05-23 NOTE — CP.PCM.PN
Subjective





- Date & Time of Evaluation


Date of Evaluation: 05/23/18


Time of Evaluation: 07:15





- Subjective


Subjective: 





Patient seen and examined bedside with Dr Kelly. Patient reports doing better 

with PT. episode of heartbun overnight that subsided with malox. Denies fever, 

chest pain, SOB, new focal motor deficit, numbness. Na normalized 135  





Objective





- Vital Signs/Intake and Output


Vital Signs (last 24 hours): 


 











Temp Pulse Resp BP Pulse Ox


 


 96.9 F L  70   20   126/65   98 


 


 05/22/18 20:36  05/23/18 05:25  05/22/18 20:36  05/23/18 05:25  05/22/18 20:36











- Medications


Medications: 


 Current Medications





Acetaminophen (Tylenol 325mg Tab)  325 mg PO Q6 PRN


   PRN Reason: Pain 1-10


   Last Admin: 05/18/18 17:35 Dose:  325 mg


Al Hydrox/Mg Hydrox/Simethicone (Maalox Plus 30 Ml)  30 ml PO Q6 PRN


   PRN Reason: Indigestion / Heartburn


   Last Admin: 05/22/18 23:19 Dose:  30 ml


Alprazolam (Xanax)  0.25 mg PO TID PRN


   PRN Reason: Anxiety


   Last Admin: 05/22/18 00:24 Dose:  0.25 mg


Aspirin (Aspirin Chewable)  81 mg PO DAILY [facility]


   Last Admin: 05/22/18 09:07 Dose:  81 mg


Atorvastatin Calcium (Lipitor)  40 mg PO HS [facility]


   Last Admin: 05/22/18 21:25 Dose:  40 mg


Clotrimazole (Lotrimin 1% Cream)  1 applic TOP 0600,1800 [facility]


   Last Admin: 05/23/18 05:19 Dose:  1 applic


Docusate Sodium (Colace)  100 mg PO HS [facility]


   Last Admin: 05/22/18 21:26 Dose:  100 mg


Enoxaparin Sodium (Lovenox)  40 mg SC DAILY [facility]


   PRN Reason: Protocol


   Last Admin: 05/22/18 09:07 Dose:  40 mg


Hydralazine HCl (Apresoline)  50 mg PO Q8 [facility]


   Last Admin: 05/23/18 05:25 Dose:  50 mg


Insulin Human Lispro (Humalog)  0 units SC 0630,1630 [facility]


   PRN Reason: Protocol


   Last Admin: 05/23/18 05:37 Dose:  Not Given


Lactulose (Enulose)  10 gm PO DAILY PRN


   PRN Reason: Constipation


Levothyroxine Sodium (Synthroid)  25 mcg PO 0630 [facility]


   Last Admin: 05/23/18 05:36 Dose:  25 mcg


Losartan Potassium (Cozaar)  50 mg PO DAILY [facility]


   Last Admin: 05/22/18 09:09 Dose:  50 mg


Metformin HCl (Glucophage)  1,000 mg PO BIDWM [facility]


   Last Admin: 05/22/18 17:23 Dose:  1,000 mg


Metoprolol Tartrate (Lopressor)  100 mg PO Q12 [facility]


   Last Admin: 05/22/18 21:26 Dose:  100 mg


Nifedipine (Procardia Xl)  30 mg PO DAILY [facility]


   Last Admin: 05/22/18 09:08 Dose:  30 mg


Pantoprazole Sodium (Protonix Ec Tab)  40 mg PO 0630 [facility]


   Last Admin: 05/23/18 05:36 Dose:  40 mg


Sitagliptin Phosphate (Januvia)  100 mg PO DAILY [facility]


   Last Admin: 05/22/18 09:08 Dose:  100 mg


Tamsulosin HCl (Flomax)  0.4 mg PO HS [facility]


   Last Admin: 05/22/18 21:27 Dose:  0.4 mg


Tolvaptan (Samsca)  15 mg PO DAILY [facility]


   Last Admin: 05/22/18 09:09 Dose:  15 mg











- Labs


Labs: 


 





 05/21/18 05:45 





 05/23/18 05:25 











- Constitutional


Appears: Non-toxic, No Acute Distress





- Head Exam


Head Exam: ATRAUMATIC, NORMOCEPHALIC





- Eye Exam


Eye Exam: Normal appearance





- ENT Exam


ENT Exam: Mucous Membranes Moist





- Respiratory Exam


Respiratory Exam: Clear to Ausculation Bilateral.  absent: Rhonchi, Wheezes





- Cardiovascular Exam


Cardiovascular Exam: REGULAR RHYTHM, +S1, +S2





- GI/Abdominal Exam


GI & Abdominal Exam: Soft, Normal Bowel Sounds.  absent: Tenderness





- Neurological Exam


Neurological Exam: Alert, Awake, Oriented x3


Neuro motor strength exam: Left Upper Extremity: 4, Right Upper Extremity: 3, 

Left Lower Extremity: 4, Right Lower Extremity: 3





- Psychiatric Exam


Psychiatric exam: Normal Mood





- Skin


Skin Exam: Intact





Assessment and Plan





- Assessment and Plan (Free Text)


Plan: 





Assessment/Plan 





1) CVA


-chronic with  residual right hemiparesis


-record on paper chart: MRI brain left pontine infarct. 2 right sphenoid 

meningiomas. 


-Pt/OT


-speech therapy 





2) DM


hga1c 7.3


-Controlled for her age


-metformin 1000 BID


-Januvia 100 mg daily 





3) HTN


-c/w losartan,


Metoprolol Tartrate 100 mg BID





4) HLD


c/w Lipitor 40 mg daily 








5) Hypothyroidism


c/w home medications





6) Hyponatremia


-Na 130


-BUN32/1.0 may be related to urinary retention: bladder scan and straight cath 

as needed. 


- secondary to SIADH caused for medication 


-Nephrology consult appreciated: continue daily samsca


-f/u CMP


Urologist consult suggested





7) DVT prophylaxis


-Lovenox 40 mg sc daily

## 2018-05-23 NOTE — CP.PCM.PN
Subjective





- Date & Time of Evaluation


Date of Evaluation: 05/23/18


Time of Evaluation: 12:00





- Subjective


Subjective: 





no acute of pain, but problems sleeping





Objective





- Vital Signs/Intake and Output


Vital Signs (last 24 hours): 


 











Temp Pulse Resp BP Pulse Ox


 


 97.0 F L  69   20   133/71   97 


 


 05/23/18 20:26  05/23/18 20:26  05/23/18 20:26  05/23/18 20:26  05/23/18 20:26











- Medications


Medications: 


 Current Medications





Acetaminophen (Tylenol 325mg Tab)  325 mg PO Q6 PRN


   PRN Reason: Pain 1-10


   Last Admin: 05/18/18 17:35 Dose:  325 mg


Al Hydrox/Mg Hydrox/Simethicone (Maalox Plus 30 Ml)  30 ml PO Q6 PRN


   PRN Reason: Indigestion / Heartburn


   Last Admin: 05/22/18 23:19 Dose:  30 ml


Alprazolam (Xanax)  0.25 mg PO TID PRN


   PRN Reason: Anxiety


   Last Admin: 05/22/18 00:24 Dose:  0.25 mg


Aspirin (Aspirin Chewable)  81 mg PO DAILY [facility]


   Last Admin: 05/23/18 09:44 Dose:  81 mg


Atorvastatin Calcium (Lipitor)  40 mg PO HS [facility]


   Last Admin: 05/22/18 21:25 Dose:  40 mg


Clotrimazole (Lotrimin 1% Cream)  1 applic TOP 0600,1800 [facility]


   Last Admin: 05/23/18 17:33 Dose:  1 applic


Docusate Sodium (Colace)  100 mg PO HS [facility]


   Last Admin: 05/22/18 21:26 Dose:  100 mg


Enoxaparin Sodium (Lovenox)  40 mg SC DAILY [facility]


   PRN Reason: Protocol


   Last Admin: 05/23/18 09:43 Dose:  40 mg


Hydralazine HCl (Apresoline)  50 mg PO Q8 [facility]


   Last Admin: 05/23/18 13:02 Dose:  50 mg


Insulin Human Lispro (Humalog)  0 units SC 0630,1630 [facility]


   PRN Reason: Protocol


   Last Admin: 05/23/18 17:33 Dose:  Not Given


Lactulose (Enulose)  10 gm PO DAILY PRN


   PRN Reason: Constipation


Levothyroxine Sodium (Synthroid)  25 mcg PO 0630 [facility]


   Last Admin: 05/23/18 05:36 Dose:  25 mcg


Losartan Potassium (Cozaar)  50 mg PO DAILY [facility]


   Last Admin: 05/23/18 09:45 Dose:  50 mg


Metformin HCl (Glucophage)  1,000 mg PO BIDWM [facility]


   Last Admin: 05/23/18 17:33 Dose:  1,000 mg


Metoprolol Tartrate (Lopressor)  100 mg PO Q12 [facility]


   Last Admin: 05/23/18 09:44 Dose:  100 mg


Nifedipine (Procardia Xl)  30 mg PO DAILY [facility]


   Last Admin: 05/23/18 09:45 Dose:  30 mg


Pantoprazole Sodium (Protonix Ec Tab)  40 mg PO 0630 [facility]


   Last Admin: 05/23/18 05:36 Dose:  40 mg


Sitagliptin Phosphate (Januvia)  100 mg PO DAILY [facility]


   Last Admin: 05/23/18 09:44 Dose:  100 mg


Tamsulosin HCl (Flomax)  0.4 mg PO HS [facility]


   Last Admin: 05/22/18 21:27 Dose:  0.4 mg


Tolvaptan (Samsca)  15 mg PO DAILY [facility]


   Last Admin: 05/23/18 09:45 Dose:  15 mg











- Labs


Labs: 


 





 05/21/18 05:45 





 05/23/18 05:25 











- Head Exam


Head Exam: ATRAUMATIC, NORMAL INSPECTION, NORMOCEPHALIC





- Eye Exam


Eye Exam: EOMI, Normal appearance


Pupil Exam: NORMAL ACCOMODATION, PERRL





- ENT Exam


ENT Exam: Mucous Membranes Moist





- Neck Exam


Neck Exam: Normal Inspection





- Respiratory Exam


Respiratory Exam: Clear to Ausculation Bilateral, NORMAL BREATHING PATTERN





- Cardiovascular Exam


Cardiovascular Exam: REGULAR RHYTHM





- GI/Abdominal Exam


GI & Abdominal Exam: Soft, Normal Bowel Sounds





- Rectal Exam


Rectal Exam: NORMAL INSPECTION





-  Exam


External exam: NORMAL EXTERNAL EXAM





- Extremities Exam


Extremities Exam: Full ROM, Normal Capillary Refill, Normal Inspection





- Back Exam


Back Exam: NORMAL INSPECTION





- Neurological Exam


Neurological Exam: Alert, Awake


Neuro motor strength exam: Left Upper Extremity: 3, Right Upper Extremity: 2/1, 

Left Lower Extremity: 3, Right Lower Extremity: 2/1





- Psychiatric Exam


Psychiatric exam: Normal Affect, Normal Mood





- Skin


Skin Exam: Dry, Normal Color





Assessment and Plan


(1) CVA (cerebral vascular accident)


Assessment & Plan: 


status post team  Dc for june 1st to subacute


Pt, Ot ,rec and speech


patient wants ativan for anxiety as compared to xanax


Status: Acute   





(2) Hyponatremia


Status: Acute   





(3) Anxiety


Status: Acute   





(4) Benign hypertension


Status: Acute   





(5) HTN (hypertension)


Status: Acute

## 2018-05-24 LAB
BUN SERPL-MCNC: 30 MG/DL (ref 7–17)
CALCIUM SERPL-MCNC: 8.9 MG/DL (ref 8.4–10.2)
ERYTHROCYTE [DISTWIDTH] IN BLOOD BY AUTOMATED COUNT: 14.2 % (ref 11.5–14.5)
GFR NON-AFRICAN AMERICAN: > 60
HGB BLD-MCNC: 11.1 G/DL (ref 12–16)
MCH RBC QN AUTO: 30.2 PG (ref 27–31)
MCHC RBC AUTO-ENTMCNC: 33 G/DL (ref 33–37)
MCV RBC AUTO: 91.3 FL (ref 81–99)
PLATELET # BLD: 406 K/UL (ref 130–400)
RBC # BLD AUTO: 3.68 MIL/UL (ref 3.8–5.2)
WBC # BLD AUTO: 4.7 K/UL (ref 4.8–10.8)

## 2018-05-24 RX ADMIN — INSULIN LISPRO SCH: 100 INJECTION, SOLUTION INTRAVENOUS; SUBCUTANEOUS at 07:01

## 2018-05-24 RX ADMIN — ENOXAPARIN SODIUM SCH MG: 40 INJECTION SUBCUTANEOUS at 08:17

## 2018-05-24 RX ADMIN — PANTOPRAZOLE SODIUM SCH MG: 40 TABLET, DELAYED RELEASE ORAL at 06:45

## 2018-05-24 RX ADMIN — INSULIN LISPRO SCH: 100 INJECTION, SOLUTION INTRAVENOUS; SUBCUTANEOUS at 17:08

## 2018-05-24 RX ADMIN — NIFEDIPINE SCH MG: 30 TABLET, FILM COATED, EXTENDED RELEASE ORAL at 08:19

## 2018-05-24 NOTE — CP.PCM.PN
Subjective





- Date & Time of Evaluation


Date of Evaluation: 05/24/18


Time of Evaluation: 07:10





- Subjective


Subjective: 





Patient seen and examined bedside with Dr Kelly. Patient reports doing better 

with PT. Patient states she is sleeping well. She denies fever, chest pain, SOB

, new focal motor deficit, numbness. Nephro consult appreciated 





Objective





- Vital Signs/Intake and Output


Vital Signs (last 24 hours): 


 











Temp Pulse Resp BP Pulse Ox


 


 97.0 F L  78   20   148/77   97 


 


 05/23/18 20:26  05/24/18 08:19  05/23/18 20:26  05/24/18 08:19  05/23/18 20:26











- Medications


Medications: 


 Current Medications





Acetaminophen (Tylenol 325mg Tab)  325 mg PO Q6 PRN


   PRN Reason: Pain 1-10


   Last Admin: 05/18/18 17:35 Dose:  325 mg


Al Hydrox/Mg Hydrox/Simethicone (Maalox Plus 30 Ml)  30 ml PO Q6 PRN


   PRN Reason: Indigestion / Heartburn


   Last Admin: 05/22/18 23:19 Dose:  30 ml


Aspirin (Aspirin Chewable)  81 mg PO DAILY [facility]


   Last Admin: 05/24/18 08:20 Dose:  81 mg


Atorvastatin Calcium (Lipitor)  40 mg PO HS [facility]


   Last Admin: 05/23/18 21:53 Dose:  40 mg


Clotrimazole (Lotrimin 1% Cream)  1 applic TOP 0600,1800 [facility]


   Last Admin: 05/24/18 06:44 Dose:  1 applic


Docusate Sodium (Colace)  100 mg PO [facility]


   Last Admin: 05/23/18 21:52 Dose:  100 mg


Enoxaparin Sodium (Lovenox)  40 mg SC DAILY [facility]


   PRN Reason: Protocol


   Last Admin: 05/24/18 08:17 Dose:  40 mg


Hydralazine HCl (Apresoline)  50 mg PO Q8 [facility]


   Last Admin: 05/24/18 07:00 Dose:  50 mg


Insulin Human Lispro (Humalog)  0 units SC 0630,1630 [facility]


   PRN Reason: Protocol


   Last Admin: 05/24/18 07:01 Dose:  Not Given


Lactulose (Enulose)  10 gm PO DAILY [facility]


   PRN Reason: Constipation


Levothyroxine Sodium (Synthroid)  25 mcg PO 0630 [facility]


   Last Admin: 05/24/18 06:45 Dose:  25 mcg


Lorazepam (Ativan)  0.5 mg PO HS [facility]N


   PRN Reason: Insomnia


   Last Admin: 05/23/18 23:46 Dose:  0.5 mg


Losartan Potassium (Cozaar)  50 mg PO DAILY [facility]


   Last Admin: 05/24/18 08:19 Dose:  50 mg


Metformin HCl (Glucophage)  1,000 mg PO BIDWM [facility]


   Last Admin: 05/24/18 08:18 Dose:  1,000 mg


Metoprolol Tartrate (Lopressor)  100 mg PO Q12 [facility]


   Last Admin: 05/24/18 08:17 Dose:  100 mg


Nifedipine (Procardia Xl)  30 mg PO DAILY [facility]


   Last Admin: 05/24/18 08:19 Dose:  30 mg


Pantoprazole Sodium (Protonix Ec Tab)  40 mg PO 0630 [facility]


   Last Admin: 05/24/18 06:45 Dose:  40 mg


Sitagliptin Phosphate (Januvia)  100 mg PO DAILY [facility]


   Last Admin: 05/24/18 08:18 Dose:  100 mg


Tamsulosin HCl (Flomax)  0.4 mg PO HS [facility]


   Last Admin: 05/23/18 21:52 Dose:  0.4 mg


Tolvaptan (Samsca)  15 mg PO DAILY [facility]


   Last Admin: 05/23/18 09:45 Dose:  15 mg











- Labs


Labs: 


 





 05/24/18 08:07 





 05/24/18 08:07 











- Constitutional


Appears: Non-toxic, No Acute Distress





- Head Exam


Head Exam: ATRAUMATIC, NORMOCEPHALIC





- Eye Exam


Eye Exam: Normal appearance





- ENT Exam


ENT Exam: Mucous Membranes Moist





- Neck Exam


Neck Exam: Normal Inspection





- Respiratory Exam


Respiratory Exam: Clear to Ausculation Bilateral.  absent: Rales, Rhonchi, 

Wheezes





- Cardiovascular Exam


Cardiovascular Exam: REGULAR RHYTHM, +S1, +S2





- GI/Abdominal Exam


GI & Abdominal Exam: Soft, Normal Bowel Sounds.  absent: Tenderness





- Extremities Exam


Extremities Exam: Normal Inspection.  absent: Pedal Edema





- Neurological Exam


Neurological Exam: Alert, Awake, Oriented x3


Neuro motor strength exam: Left Upper Extremity: 4, Right Upper Extremity: 3, 

Left Lower Extremity: 4, Right Lower Extremity: 3





- Psychiatric Exam


Psychiatric exam: Normal Affect, Normal Mood





- Skin


Skin Exam: Intact





Assessment and Plan





- Assessment and Plan (Free Text)


Plan: 





Assessment/Plan 





1) CVA


-chronic with  residual right hemiparesis


-record on paper chart: MRI brain left pontine infarct. 2 right sphenoid 

meningiomas. 


-Pt/OT


-speech therapy 





2) DM


hga1c 7.3


-Controlled for her age


-metformin 1000 BID


-Januvia 100 mg daily 





3) HTN


-c/w losartan,


Metoprolol Tartrate 100 mg BID





4) HLD


c/w Lipitor 40 mg daily 








5) Hypothyroidism


c/w home medications





6) Hyponatremia


-Na 132


- secondary to SIADH caused for medication 


-Nephrology consult appreciated: continue daily samsca, may be increased dose 

if continue NA trending down. 


-f/u CMP








7) DVT prophylaxis


-Lovenox 40 mg sc daily

## 2018-05-24 NOTE — CP.PCM.PN
Subjective





- Date & Time of Evaluation


Date of Evaluation: 05/24/18


Time of Evaluation: 09:40





- Subjective


Subjective: 





No significant change clinicly


Patient receiving therapy


No nausea or vomiting





Objective





- Vital Signs/Intake and Output


Vital Signs (last 24 hours): 


 











Temp Pulse Resp BP Pulse Ox


 


 97.0 F L  78   20   148/77   97 


 


 05/23/18 20:26  05/24/18 08:19  05/23/18 20:26  05/24/18 08:19  05/23/18 20:26











- Medications


Medications: 


 Current Medications





Acetaminophen (Tylenol 325mg Tab)  325 mg PO Q6 PRN


   PRN Reason: Pain 1-10


   Last Admin: 05/18/18 17:35 Dose:  325 mg


Al Hydrox/Mg Hydrox/Simethicone (Maalox Plus 30 Ml)  30 ml PO Q6 PRN


   PRN Reason: Indigestion / Heartburn


   Last Admin: 05/22/18 23:19 Dose:  30 ml


Aspirin (Aspirin Chewable)  81 mg PO DAILY [facility]


   Last Admin: 05/24/18 08:20 Dose:  81 mg


Atorvastatin Calcium (Lipitor)  40 mg PO HS [facility]


   Last Admin: 05/23/18 21:53 Dose:  40 mg


Clotrimazole (Lotrimin 1% Cream)  1 applic TOP 0600,1800 [facility]


   Last Admin: 05/24/18 06:44 Dose:  1 applic


Docusate Sodium (Colace)  100 mg PO HS [facility]


   Last Admin: 05/23/18 21:52 Dose:  100 mg


Enoxaparin Sodium (Lovenox)  40 mg SC DAILY [facility]


   PRN Reason: Protocol


   Last Admin: 05/24/18 08:17 Dose:  40 mg


Hydralazine HCl (Apresoline)  50 mg PO Q8 [facility]


   Last Admin: 05/24/18 07:00 Dose:  50 mg


Insulin Human Lispro (Humalog)  0 units SC 0630,1630 [facility]


   PRN Reason: Protocol


   Last Admin: 05/24/18 07:01 Dose:  Not Given


Lactulose (Enulose)  10 gm PO DAILY [facility]


   PRN Reason: Constipation


Levothyroxine Sodium (Synthroid)  25 mcg PO 0630 [facility]


   Last Admin: 05/24/18 06:45 Dose:  25 mcg


Lorazepam (Ativan)  0.5 mg PO HS [facility]


   PRN Reason: Insomnia


   Last Admin: 05/23/18 23:46 Dose:  0.5 mg


Losartan Potassium (Cozaar)  50 mg PO DAILY [facility]


   Last Admin: 05/24/18 08:19 Dose:  50 mg


Metformin HCl (Glucophage)  1,000 mg PO BIDWM [facility]


   Last Admin: 05/24/18 08:18 Dose:  1,000 mg


Metoprolol Tartrate (Lopressor)  100 mg PO Q12 [facility]


   Last Admin: 05/24/18 08:17 Dose:  100 mg


Nifedipine (Procardia Xl)  30 mg PO DAILY [facility]


   Last Admin: 05/24/18 08:19 Dose:  30 mg


Pantoprazole Sodium (Protonix Ec Tab)  40 mg PO 0630 [facility]


   Last Admin: 05/24/18 06:45 Dose:  40 mg


Sitagliptin Phosphate (Januvia)  100 mg PO DAILY [facility]


   Last Admin: 05/24/18 08:18 Dose:  100 mg


Tamsulosin HCl (Flomax)  0.4 mg PO HS [facility]


   Last Admin: 05/23/18 21:52 Dose:  0.4 mg


Tolvaptan (Samsca)  15 mg PO DAILY [facility]


   Last Admin: 05/23/18 09:45 Dose:  15 mg











- Labs


Labs: 


 





 05/24/18 08:07 





 05/24/18 08:07 











- Constitutional


Appears: No Acute Distress





- ENT Exam


ENT Exam: Mucous Membranes Moist





- Neck Exam


Neck Exam: absent: Lymphadenopathy





- Respiratory Exam


Respiratory Exam: absent: Chest Wall Tenderness





- Cardiovascular Exam


Cardiovascular Exam: absent: Gallop, JVD, Rubs





- GI/Abdominal Exam


GI & Abdominal Exam: Soft, Normal Bowel Sounds





- Extremities Exam


Extremities Exam: absent: Calf Tenderness





- Back Exam


Back Exam: absent: CVA tenderness (L), CVA tenderness (R)





- Neurological Exam


Neurological Exam: Alert





- Psychiatric Exam


Psychiatric exam: Normal Affect





- Skin


Skin Exam: absent: Cyanosis





Assessment and Plan


(1) Hyponatremia


Assessment & Plan: 


Assessment & Plan: 


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


DM, HTN, hx of breast CA





Plan


serum sodium remained 133,


if serum sodium continue to drop.father , then will increase Samsca to 30 mg 

daily from 15mg


BP controlled on meds as ordered


continue daily samsca, na is stable


Glycemic control


Further work up for as per primary team


Status: Acute

## 2018-05-25 RX ADMIN — PANTOPRAZOLE SODIUM SCH MG: 40 TABLET, DELAYED RELEASE ORAL at 06:20

## 2018-05-25 RX ADMIN — INSULIN LISPRO SCH: 100 INJECTION, SOLUTION INTRAVENOUS; SUBCUTANEOUS at 16:30

## 2018-05-25 RX ADMIN — ENOXAPARIN SODIUM SCH MG: 40 INJECTION SUBCUTANEOUS at 09:01

## 2018-05-25 RX ADMIN — INSULIN LISPRO SCH: 100 INJECTION, SOLUTION INTRAVENOUS; SUBCUTANEOUS at 06:22

## 2018-05-25 RX ADMIN — NIFEDIPINE SCH MG: 30 TABLET, FILM COATED, EXTENDED RELEASE ORAL at 09:04

## 2018-05-25 NOTE — CP.PCM.PN
Subjective





- Date & Time of Evaluation


Date of Evaluation: 05/25/18


Time of Evaluation: 13:12





- Subjective


Subjective: 





No significant changes clinically patient is receiving rehabilitation and 

therapy


No nausea or vomiting But that seems to be okay





Objective





- Vital Signs/Intake and Output


Vital Signs (last 24 hours): 


 











Temp Pulse Resp BP Pulse Ox


 


 98 F   81   20   121/80   98 


 


 05/25/18 08:59  05/25/18 09:04  05/25/18 08:59  05/25/18 09:04  05/25/18 08:59











- Medications


Medications: 


 Current Medications





Acetaminophen (Tylenol 325mg Tab)  325 mg PO Q6 PRN


   PRN Reason: Pain 1-10


   Last Admin: 05/18/18 17:35 Dose:  325 mg


Al Hydrox/Mg Hydrox/Simethicone (Maalox Plus 30 Ml)  30 ml PO Q6 PRN


   PRN Reason: Indigestion / Heartburn


   Last Admin: 05/22/18 23:19 Dose:  30 ml


Aspirin (Aspirin Chewable)  81 mg PO DAILY [facility]


   Last Admin: 05/25/18 09:02 Dose:  81 mg


Atorvastatin Calcium (Lipitor)  40 mg PO HS [facility]


   Last Admin: 05/24/18 21:55 Dose:  40 mg


Clotrimazole (Lotrimin 1% Cream)  1 applic TOP 0600,1800 [facility]


   Last Admin: 05/25/18 06:21 Dose:  1 applic


Docusate Sodium (Colace)  100 mg PO HS [facility]


   Last Admin: 05/24/18 21:56 Dose:  100 mg


Hydralazine HCl (Apresoline)  50 mg PO Q8 [facility]


   Last Admin: 05/25/18 06:21 Dose:  50 mg


Insulin Human Lispro (Humalog)  0 units SC 0630,1630 [facility]


   PRN Reason: Protocol


   Last Admin: 05/25/18 06:22 Dose:  Not Given


Lactulose (Enulose)  10 gm PO DAILY PRN


   PRN Reason: Constipation


Levothyroxine Sodium (Synthroid)  25 mcg PO 0630 [facility]


   Last Admin: 05/25/18 06:20 Dose:  25 mcg


Lorazepam (Ativan)  0.5 mg PO HS PRN


   PRN Reason: Insomnia


   Last Admin: 05/24/18 21:56 Dose:  0.5 mg


Losartan Potassium (Cozaar)  50 mg PO DAILY [facility]


   Last Admin: 05/25/18 09:02 Dose:  50 mg


Metformin HCl (Glucophage)  1,000 mg PO BIDWM [facility]


   Last Admin: 05/25/18 08:00 Dose:  1,000 mg


Metoprolol Tartrate (Lopressor)  100 mg PO Q12 [facility]


   Last Admin: 05/25/18 09:03 Dose:  100 mg


Nifedipine (Procardia Xl)  30 mg PO DAILY [facility]


   Last Admin: 05/25/18 09:04 Dose:  30 mg


Pantoprazole Sodium (Protonix Ec Tab)  40 mg PO 0630 [facility]


   Last Admin: 05/25/18 06:20 Dose:  40 mg


Sitagliptin Phosphate (Januvia)  100 mg PO DAILY [facility]


   Last Admin: 05/25/18 09:02 Dose:  100 mg


Tamsulosin HCl (Flomax)  0.4 mg PO HS [facility]


   Last Admin: 05/24/18 21:56 Dose:  0.4 mg


Tolvaptan (Samsca)  15 mg PO DAILY [facility]


   Last Admin: 05/25/18 09:01 Dose:  15 mg











- Labs


Labs: 


 





 05/24/18 08:07 





 05/24/18 08:07 











- Constitutional


Appears: No Acute Distress





- ENT Exam


ENT Exam: Mucous Membranes Moist





- Respiratory Exam


Respiratory Exam: NORMAL BREATHING PATTERN.  absent: Chest Wall Tenderness





- Cardiovascular Exam


Cardiovascular Exam: absent: JVD, Rubs





- GI/Abdominal Exam


GI & Abdominal Exam: Soft, Normal Bowel Sounds





- Extremities Exam


Extremities Exam: absent: Calf Tenderness





- Back Exam


Back Exam: absent: CVA tenderness (L), CVA tenderness (R)





- Neurological Exam


Neurological Exam: Alert





- Psychiatric Exam


Psychiatric exam: Normal Affect





- Skin


Skin Exam: absent: Cyanosis





Assessment and Plan


(1) Hyponatremia


Assessment & Plan: 


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


DM, HTN, hx of breast CA





Plan


repeat serum sodium for tomorrow 


serum sodium remained 133,


if serum sodium continue to drop.father , then will increase Samsca to 30 mg 

daily from 15mg


BP controlled on meds as ordered


continue daily samsca, na is stable


Glycemic control


Further work up for as per primary team


Status: Acute

## 2018-05-25 NOTE — CP.PCM.PN
Subjective





- Date & Time of Evaluation


Date of Evaluation: 05/25/18


Time of Evaluation: 07:20





- Subjective


Subjective: 





Patient seen and examined bedside with Dr Kelly, improving with PT. She denies 

fever, chest pain, SOB, new focal motor deficit, numbness








Objective





- Vital Signs/Intake and Output


Vital Signs (last 24 hours): 


 











Temp Pulse Resp BP Pulse Ox


 


 98.8 F   71   20   118/61   98 


 


 05/24/18 21:05  05/25/18 06:21  05/24/18 21:05  05/25/18 06:21  05/24/18 21:05











- Medications


Medications: 


 Current Medications





Acetaminophen (Tylenol 325mg Tab)  325 mg PO Q6 PRN


   PRN Reason: Pain 1-10


   Last Admin: 05/18/18 17:35 Dose:  325 mg


Al Hydrox/Mg Hydrox/Simethicone (Maalox Plus 30 Ml)  30 ml PO Q6 PRN


   PRN Reason: Indigestion / Heartburn


   Last Admin: 05/22/18 23:19 Dose:  30 ml


Aspirin (Aspirin Chewable)  81 mg PO DAILY [facility]


   Last Admin: 05/24/18 08:20 Dose:  81 mg


Atorvastatin Calcium (Lipitor)  40 mg PO HS [facility]


   Last Admin: 05/24/18 21:55 Dose:  40 mg


Clotrimazole (Lotrimin 1% Cream)  1 applic TOP 0600,1800 [facility]


   Last Admin: 05/25/18 06:21 Dose:  1 applic


Docusate Sodium (Colace)  100 mg PO HS [facility]


   Last Admin: 05/24/18 21:56 Dose:  100 mg


Enoxaparin Sodium (Lovenox)  40 mg SC DAILY [facility]


   PRN Reason: Protocol


   Last Admin: 05/24/18 08:17 Dose:  40 mg


Hydralazine HCl (Apresoline)  50 mg PO Q8 [facility]


   Last Admin: 05/25/18 06:21 Dose:  50 mg


Insulin Human Lispro (Humalog)  0 units SC 0630,1630 [facility]


   PRN Reason: Protocol


   Last Admin: 05/25/18 06:22 Dose:  Not Given


Lactulose (Enulose)  10 gm PO DAILY PRN


   PRN Reason: Constipation


Levothyroxine Sodium (Synthroid)  25 mcg PO 0630 [facility]


   Last Admin: 05/25/18 06:20 Dose:  25 mcg


Lorazepam (Ativan)  0.5 mg PO HS PRN


   PRN Reason: Insomnia


   Last Admin: 05/24/18 21:56 Dose:  0.5 mg


Losartan Potassium (Cozaar)  50 mg PO DAILY [facility]


   Last Admin: 05/24/18 08:19 Dose:  50 mg


Metformin HCl (Glucophage)  1,000 mg PO BIDWM [facility]


   Last Admin: 05/24/18 17:08 Dose:  1,000 mg


Metoprolol Tartrate (Lopressor)  100 mg PO Q12 [facility]


   Last Admin: 05/24/18 21:55 Dose:  100 mg


Nifedipine (Procardia Xl)  30 mg PO DAILY [facility]


   Last Admin: 05/24/18 08:19 Dose:  30 mg


Pantoprazole Sodium (Protonix Ec Tab)  40 mg PO 0630 [facility]


   Last Admin: 05/25/18 06:20 Dose:  40 mg


Sitagliptin Phosphate (Januvia)  100 mg PO DAILY [facility]


   Last Admin: 05/24/18 08:18 Dose:  100 mg


Tamsulosin HCl (Flomax)  0.4 mg PO HS [facility]


   Last Admin: 05/24/18 21:56 Dose:  0.4 mg


Tolvaptan (Samsca)  15 mg PO DAILY [facility]


   Last Admin: 05/24/18 09:43 Dose:  15 mg











- Labs


Labs: 


 





 05/24/18 08:07 





 05/24/18 08:07 











- Constitutional


Appears: Non-toxic, No Acute Distress





- Head Exam


Head Exam: ATRAUMATIC, NORMOCEPHALIC





- Eye Exam


Eye Exam: Normal appearance





- ENT Exam


ENT Exam: Mucous Membranes Moist





- Respiratory Exam


Respiratory Exam: Clear to Ausculation Bilateral.  absent: Rhonchi, Wheezes





- Cardiovascular Exam


Cardiovascular Exam: REGULAR RHYTHM, +S1, +S2





- GI/Abdominal Exam


GI & Abdominal Exam: Soft, Normal Bowel Sounds.  absent: Tenderness





- Extremities Exam


Extremities Exam: Normal Inspection.  absent: Pedal Edema





- Neurological Exam


Neurological Exam: Alert, Awake


Neuro motor strength exam: Left Upper Extremity: 4, Right Upper Extremity: 3, 

Left Lower Extremity: 4, Right Lower Extremity: 3





- Psychiatric Exam


Psychiatric exam: Normal Affect, Normal Mood





- Skin


Skin Exam: Intact





Assessment and Plan





- Assessment and Plan (Free Text)


Plan: 





Assessment/Plan 





1) CVA


-chronic with  residual right hemiparesis


-record on paper chart: MRI brain left pontine infarct. 2 right sphenoid 

meningiomas. 


-Pt/OT


-speech therapy 





2) DM


hga1c 7.3


-Controlled for her age


-metformin 1000 BID


-Januvia 100 mg daily 





3) HTN


-c/w losartan,


Metoprolol Tartrate 100 mg BID





4) HLD


c/w Lipitor 40 mg daily 








5) Hypothyroidism


c/w home medications





6) Hyponatremia


-Na 132


- secondary to SIADH caused for medication 


-Nephrology consult appreciated: continue daily samsca, may be increased dose 

if continue NA trending down. 


-f/u CMP








7) DVT prophylaxis


-Lovenox 40 mg sc daily

## 2018-05-25 NOTE — CP.PCM.CON
History of Present Illness





- History of Present Illness


History of Present Illness: 








Pt is a 70 year old female admitted to [facility] following a CVA. med history 

positive for CVA, HTN, DM, past breast CA.  See medical record for complete 

medical history and medication list.  Pt lives alone.  She never  and 

has no children.  Pt reported having nieces/nephews out of town.  She reported 

positive relationships with family members and thoughts of going with them.  Ed/

Voc: pt raised in the [facility], graduated college.  Worked in Business, then 

at [employer] in the [email or web address].  Psych history denied, pt denied a history of 

alc/sub abuse.  Pt spoke of the CVA on evaluation, desire for recovery and 

desire for independence. 


MSE: pt alert, oriented x2, relevant/coherent, no psychosis, affect constricted

, mood dysphoric no si hi ideation.


Dx: Adjustment Dx


plan: Continued Sup therapy





Past Patient History





- Infectious Disease


Hx of Infectious Diseases: None





- Tetanus Immunizations


Tetanus Immunization: Up to Date





- Past Medical History & Family History


Past Medical History?: Yes





- Past Social History


Smoking Status: Never Smoked





- CARDIAC


Hx Hypertension: Yes





- PULMONARY


Hx Asthma: Yes





- NEUROLOGICAL


HX Cerebrovascular Accident: Yes (4/23/2018)


Hx Paralysis: No


Other/Comment: meningioma





- HEENT


Hx HEENT Problems: No





- ENDOCRINE/METABOLIC


Hx Diabetes Mellitus Type 2: Yes


Hx Hypothyroidism: Yes





- HEMATOLOGICAL/ONCOLOGICAL


Hx Cancer: Yes





- MUSCULOSKELETAL/RHEUMATOLOGICAL


Hx Falls: No


Hx Osteoporosis: Yes





- GASTROINTESTINAL


Hx Gastrointestinal Disorders: No


Other/Comment: + ISCHEMIC COLITIS , COLONIC POLYPS





- GENITOURINARY/GYNECOLOGICAL


Hx Genitourinary Disorders: No


Hx Incontinence: Yes





- PSYCHIATRIC


Hx Anxiety: Yes


Hx Substance Use: No





- SURGICAL HISTORY


Hx Surgeries: Yes


Hx Breast Biopsy: Yes (left lumpectomy S/P CHEMO)


Hx Mastectomy: Yes (right 1988, S/P CHEMO)


Other/Comment: LAPAROTOMY 1971





- ANESTHESIA


Hx Anesthesia: Yes


Hx Anesthesia Reactions: No


Hx Malignant Hyperthermia: No


Has any member of the family had a problem w/ anesthesia?: No





Meds


Allergies/Adverse Reactions: 


 Allergies











Allergy/AdvReac Type Severity Reaction Status Date / Time


 


iodine Allergy Mild RASH Verified 05/08/18 17:41


 


codeine Allergy  ANAPHYLAXIS Verified 05/08/18 17:41


 


FISH Allergy  ANAPHYLAXIS Verified 05/08/18 17:41


 


Penicillins Allergy  RASH Verified 05/08/18 17:41


 


shellfish derived Allergy  ANAPHYLAXIS Verified 05/08/18 17:41


 


Sulfa (Sulfonamide Allergy  ANAPHYLAXIS Verified 05/08/18 17:41





Antibiotics)     














- Medications


Medications: 


 Current Medications





Acetaminophen (Tylenol 325mg Tab)  325 mg PO Q6 PRN


   PRN Reason: Pain 1-10


   Last Admin: 05/18/18 17:35 Dose:  325 mg


Al Hydrox/Mg Hydrox/Simethicone (Maalox Plus 30 Ml)  30 ml PO Q6 PRN


   PRN Reason: Indigestion / Heartburn


   Last Admin: 05/22/18 23:19 Dose:  30 ml


Aspirin (Aspirin Chewable)  81 mg PO DAILY [facility]


   Last Admin: 05/25/18 09:02 Dose:  81 mg


Atorvastatin Calcium (Lipitor)  40 mg PO HS [facility]


   Last Admin: 05/24/18 21:55 Dose:  40 mg


Clotrimazole (Lotrimin 1% Cream)  1 applic TOP 0600,1800 [facility]


   Last Admin: 05/25/18 06:21 Dose:  1 applic


Docusate Sodium (Colace)  100 mg PO HS [facility]


   Last Admin: 05/24/18 21:56 Dose:  100 mg


Enoxaparin Sodium (Lovenox)  40 mg SC DAILY [facility]


   PRN Reason: Protocol


   Last Admin: 05/25/18 09:01 Dose:  40 mg


Hydralazine HCl (Apresoline)  50 mg PO Q8 [facility]


   Last Admin: 05/25/18 06:21 Dose:  50 mg


Insulin Human Lispro (Humalog)  0 units SC 0630,1630 [facility]


   PRN Reason: Protocol


   Last Admin: 05/25/18 06:22 Dose:  Not Given


Lactulose (Enulose)  10 gm PO DAILY PRN


   PRN Reason: Constipation


Levothyroxine Sodium (Synthroid)  25 mcg PO 0630 [facility]


   Last Admin: 05/25/18 06:20 Dose:  25 mcg


Lorazepam (Ativan)  0.5 mg PO HS PRN


   PRN Reason: Insomnia


   Last Admin: 05/24/18 21:56 Dose:  0.5 mg


Losartan Potassium (Cozaar)  50 mg PO DAILY [facility]


   Last Admin: 05/25/18 09:02 Dose:  50 mg


Metformin HCl (Glucophage)  1,000 mg PO BIDWM [facility]


   Last Admin: 05/25/18 08:00 Dose:  1,000 mg


Metoprolol Tartrate (Lopressor)  100 mg PO Q12 [facility]


   Last Admin: 05/25/18 09:03 Dose:  100 mg


Nifedipine (Procardia Xl)  30 mg PO DAILY [facility]


   Last Admin: 05/25/18 09:04 Dose:  30 mg


Pantoprazole Sodium (Protonix Ec Tab)  40 mg PO 0630 [facility]


   Last Admin: 05/25/18 06:20 Dose:  40 mg


Sitagliptin Phosphate (Januvia)  100 mg PO DAILY [facility]


   Last Admin: 05/25/18 09:02 Dose:  100 mg


Tamsulosin HCl (Flomax)  0.4 mg PO HS [facility]


   Last Admin: 05/24/18 21:56 Dose:  0.4 mg


Tolvaptan (Samsca)  15 mg PO DAILY [facility]


   Last Admin: 05/25/18 09:01 Dose:  15 mg











Results





- Vital Signs


Recent Vital Signs: 


 Last Vital Signs











Temp  98 F   05/25/18 08:59


 


Pulse  81   05/25/18 09:04


 


Resp  20   05/25/18 08:59


 


BP  121/80   05/25/18 09:04


 


Pulse Ox  98   05/25/18 08:59














- Labs


Result Diagrams: 


 05/24/18 08:07





 05/24/18 08:07


Labs: 


 Laboratory Results - last 24 hr











  05/24/18 05/25/18





  15:46 06:10


 


POC Glucose (mg/dL)  124 H  93

## 2018-05-25 NOTE — CP.PCM.PN
Subjective





- Date & Time of Evaluation


Date of Evaluation: 05/25/18


Time of Evaluation: 11:00





- Subjective


Subjective: 





no acute complaints slept well





Objective





- Vital Signs/Intake and Output


Vital Signs (last 24 hours): 


 











Temp Pulse Resp BP Pulse Ox


 


 98 F   81   20   121/80   98 


 


 05/25/18 08:59  05/25/18 09:04  05/25/18 08:59  05/25/18 09:04  05/25/18 08:59











- Medications


Medications: 


 Current Medications





Acetaminophen (Tylenol 325mg Tab)  325 mg PO Q6 PRN


   PRN Reason: Pain 1-10


   Last Admin: 05/18/18 17:35 Dose:  325 mg


Al Hydrox/Mg Hydrox/Simethicone (Maalox Plus 30 Ml)  30 ml PO Q6 PRN


   PRN Reason: Indigestion / Heartburn


   Last Admin: 05/22/18 23:19 Dose:  30 ml


Aspirin (Aspirin Chewable)  81 mg PO DAILY [facility]


   Last Admin: 05/25/18 09:02 Dose:  81 mg


Atorvastatin Calcium (Lipitor)  40 mg PO HS [facility]


   Last Admin: 05/24/18 21:55 Dose:  40 mg


Clotrimazole (Lotrimin 1% Cream)  1 applic TOP 0600,1800 [facility]


   Last Admin: 05/25/18 06:21 Dose:  1 applic


Docusate Sodium (Colace)  100 mg PO HS [facility]


   Last Admin: 05/24/18 21:56 Dose:  100 mg


Hydralazine HCl (Apresoline)  50 mg PO Q8 [facility]


   Last Admin: 05/25/18 06:21 Dose:  50 mg


Insulin Human Lispro (Humalog)  0 units SC 0630,1630 [facility]


   PRN Reason: Protocol


   Last Admin: 05/25/18 06:22 Dose:  Not Given


Lactulose (Enulose)  10 gm PO DAILY PRN


   PRN Reason: Constipation


Levothyroxine Sodium (Synthroid)  25 mcg PO 0630 [facility]


   Last Admin: 05/25/18 06:20 Dose:  25 mcg


Lorazepam (Ativan)  0.5 mg PO HS PRN


   PRN Reason: Insomnia


   Last Admin: 05/24/18 21:56 Dose:  0.5 mg


Losartan Potassium (Cozaar)  50 mg PO DAILY [facility]


   Last Admin: 05/25/18 09:02 Dose:  50 mg


Metformin HCl (Glucophage)  1,000 mg PO BIDWM [facility]


   Last Admin: 05/25/18 08:00 Dose:  1,000 mg


Metoprolol Tartrate (Lopressor)  100 mg PO Q12 [facility]


   Last Admin: 05/25/18 09:03 Dose:  100 mg


Nifedipine (Procardia Xl)  30 mg PO DAILY [facility]


   Last Admin: 05/25/18 09:04 Dose:  30 mg


Pantoprazole Sodium (Protonix Ec Tab)  40 mg PO 0630 [facility]


   Last Admin: 05/25/18 06:20 Dose:  40 mg


Sitagliptin Phosphate (Januvia)  100 mg PO DAILY [facility]


   Last Admin: 05/25/18 09:02 Dose:  100 mg


Tamsulosin HCl (Flomax)  0.4 mg PO HS [facility]


   Last Admin: 05/24/18 21:56 Dose:  0.4 mg


Tolvaptan (Samsca)  15 mg PO DAILY [facility]


   Last Admin: 05/25/18 09:01 Dose:  15 mg











- Labs


Labs: 


 





 05/24/18 08:07 





 05/24/18 08:07 











- Head Exam


Head Exam: ATRAUMATIC, NORMAL INSPECTION, NORMOCEPHALIC





- Eye Exam


Eye Exam: EOMI, Normal appearance, PERRL


Pupil Exam: NORMAL ACCOMODATION





- ENT Exam


ENT Exam: Mucous Membranes Moist, Normal Exam





- Neck Exam


Neck Exam: Normal Inspection





- Respiratory Exam


Respiratory Exam: Clear to Ausculation Bilateral, NORMAL BREATHING PATTERN





- Cardiovascular Exam


Cardiovascular Exam: REGULAR RHYTHM





- GI/Abdominal Exam


GI & Abdominal Exam: Soft, Normal Bowel Sounds





- Rectal Exam


Rectal Exam: NORMAL INSPECTION





-  Exam


External exam: NORMAL EXTERNAL EXAM





- Extremities Exam


Extremities Exam: Full ROM, Normal Capillary Refill, Normal Inspection





- Back Exam


Back Exam: NORMAL INSPECTION





- Neurological Exam


Neurological Exam: Alert, Awake


Neuro motor strength exam: Left Upper Extremity: 3, Right Upper Extremity: 2/1, 

Left Lower Extremity: 3, Right Lower Extremity: 2/1





- Psychiatric Exam


Psychiatric exam: Normal Affect, Normal Mood





- Skin


Skin Exam: Dry, Intact





Assessment and Plan


(1) CVA (cerebral vascular accident)


Assessment & Plan: 


[facility]


Status: Acute   





(2) Hyponatremia


Status: Acute   





(3) Anxiety


Status: Acute   





(4) Benign hypertension


Status: Acute   





(5) HTN (hypertension)


Status: Acute

## 2018-05-26 LAB
BUN SERPL-MCNC: 31 MG/DL (ref 7–17)
CALCIUM SERPL-MCNC: 9.2 MG/DL (ref 8.4–10.2)
GFR NON-AFRICAN AMERICAN: 53

## 2018-05-26 RX ADMIN — INSULIN LISPRO SCH: 100 INJECTION, SOLUTION INTRAVENOUS; SUBCUTANEOUS at 16:30

## 2018-05-26 RX ADMIN — PANTOPRAZOLE SODIUM SCH MG: 40 TABLET, DELAYED RELEASE ORAL at 06:12

## 2018-05-26 RX ADMIN — INSULIN LISPRO SCH: 100 INJECTION, SOLUTION INTRAVENOUS; SUBCUTANEOUS at 06:18

## 2018-05-26 RX ADMIN — NIFEDIPINE SCH: 30 TABLET, FILM COATED, EXTENDED RELEASE ORAL at 08:43

## 2018-05-26 NOTE — CP.PCM.PN
Subjective





- Date & Time of Evaluation


Date of Evaluation: 05/26/18


Time of Evaluation: 19:44





- Subjective


Subjective: 





Dr Dhillon coverage for Dr Tatiana Lee is in good spirits


happy with care


denies sob/cp or fever


working hard in therapies


continue current care





Objective





- Vital Signs/Intake and Output


Vital Signs (last 24 hours): 


 











Temp Pulse Resp BP Pulse Ox


 


 97.7 F   82   19   100/70   98 


 


 05/26/18 08:15  05/26/18 14:34  05/26/18 08:15  05/26/18 14:34  05/26/18 08:15











- Medications


Medications: 


 Current Medications





Acetaminophen (Tylenol 325mg Tab)  325 mg PO Q6 PRN


   PRN Reason: Pain 1-10


   Last Admin: 05/18/18 17:35 Dose:  325 mg


Al Hydrox/Mg Hydrox/Simethicone (Maalox Plus 30 Ml)  30 ml PO Q6 PRN


   PRN Reason: Indigestion / Heartburn


   Last Admin: 05/22/18 23:19 Dose:  30 ml


Aspirin (Aspirin Chewable)  81 mg PO DAILY [facility]


   Last Admin: 05/26/18 08:42 Dose:  81 mg


Atorvastatin Calcium (Lipitor)  40 mg PO HS [facility]


   Last Admin: 05/25/18 21:12 Dose:  40 mg


Clotrimazole (Lotrimin 1% Cream)  1 applic TOP 0600,1800 [facility]


   Last Admin: 05/26/18 17:44 Dose:  1 applic


Docusate Sodium (Colace)  100 mg PO HS [facility]


   Last Admin: 05/25/18 21:12 Dose:  100 mg


Hydralazine HCl (Apresoline)  50 mg PO Q8 [facility]


   Last Admin: 05/26/18 14:34 Dose:  Not Given


Insulin Human Lispro (Humalog)  0 units SC 0630,1630 [facility]


   PRN Reason: Protocol


   Last Admin: 05/26/18 16:30 Dose:  Not Given


Lactulose (Enulose)  10 gm PO DAILY [facility]


   PRN Reason: Constipation


Levothyroxine Sodium (Synthroid)  25 mcg PO 0630 [facility]


   Last Admin: 05/26/18 06:12 Dose:  25 mcg


Lorazepam (Ativan)  0.5 mg PO HS [facility]


   PRN Reason: Insomnia


   Last Admin: 05/25/18 22:03 Dose:  0.5 mg


Losartan Potassium (Cozaar)  50 mg PO DAILY [facility]


   Last Admin: 05/26/18 08:42 Dose:  Not Given


Metformin HCl (Glucophage)  1,000 mg PO BIDWM [facility]


   Last Admin: 05/26/18 17:43 Dose:  1,000 mg


Metoprolol Tartrate (Lopressor)  100 mg PO Q12 [facility]


   Last Admin: 05/26/18 08:44 Dose:  Not Given


Nifedipine (Procardia Xl)  30 mg PO DAILY [facility]


   Last Admin: 05/26/18 08:43 Dose:  Not Given


Pantoprazole Sodium (Protonix Ec Tab)  40 mg PO 0630 [facility]


   Last Admin: 05/26/18 06:12 Dose:  40 mg


Sitagliptin Phosphate (Januvia)  100 mg PO DAILY [facility]


   Last Admin: 05/26/18 08:43 Dose:  100 mg


Tamsulosin HCl (Flomax)  0.4 mg PO HS [facility]


   Last Admin: 05/25/18 21:12 Dose:  0.4 mg


Tolvaptan (Samsca)  15 mg PO DAILY [facility]


   Last Admin: 05/26/18 14:34 Dose:  15 mg











- Labs


Labs: 


 





 05/24/18 08:07 





 05/26/18 06:00

## 2018-05-26 NOTE — CP.PCM.PN
Subjective





- Date & Time of Evaluation


Date of Evaluation: 05/26/18


Time of Evaluation: 11:00





- Subjective


Subjective: 





Follow up Nephrology Consultation Note





Assessment: Stable


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


DM, HTN, hx of breast CA





Plan


BP controlled on meds as ordered


continue samsca 15 mg/day As her serum sodium continued to decrease without 

this medication


avoid correction in serum Na >6-8 meq/24 hrs


Glycemic control


Further work up for as per primary team





Physical Examination: 


General Appearance: Comfortable, in no acute respiratory distress, co-operative 

. 


Vitals reviewed 


Head; Atraumatic, normocephalic


ENT: no ulcers 


EYES: Sclera is anicteric.


Neck; supple 


Lungs: Normal respiratory rate/effort. Breath sounds bilateral equal and clear


Heart: Normal rate. s1s2 normal. No rub or gallop. 


Extremities: no edema. No varicose veins


Neurological: Patient is alert, awake and oriented to person, place and time. 

has rt side weakness and slurred speech


Skin: Warm and dry 


Abdomen: Abdomen is soft. Bowel sounds +. There is no abdominal tenderness, no 

guarding/rigidity no organomegaly


Psych: normal insight





Objective





- Vital Signs/Intake and Output


Vital Signs (last 24 hours): 


 











Temp Pulse Resp BP Pulse Ox


 


 98.1 F   80   20   121/69   96 


 


 05/26/18 20:24  05/26/18 21:46  05/26/18 20:24  05/26/18 21:46  05/26/18 20:24











- Medications


Medications: 


 Current Medications





Acetaminophen (Tylenol 325mg Tab)  325 mg PO Q6 PRN


   PRN Reason: Pain 1-10


   Last Admin: 05/18/18 17:35 Dose:  325 mg


Al Hydrox/Mg Hydrox/Simethicone (Maalox Plus 30 Ml)  30 ml PO Q6 PRN


   PRN Reason: Indigestion / Heartburn


   Last Admin: 05/22/18 23:19 Dose:  30 ml


Aspirin (Aspirin Chewable)  81 mg PO DAILY [facility]


   Last Admin: 05/26/18 08:42 Dose:  81 mg


Atorvastatin Calcium (Lipitor)  40 mg PO HS [facility]


   Last Admin: 05/26/18 21:46 Dose:  40 mg


Clotrimazole (Lotrimin 1% Cream)  1 applic TOP 0600,1800 [facility]


   Last Admin: 05/26/18 17:44 Dose:  1 applic


Docusate Sodium (Colace)  100 mg PO HS [facility]


   Last Admin: 05/26/18 21:45 Dose:  100 mg


Hydralazine HCl (Apresoline)  50 mg PO Q8 [facility]


   Last Admin: 05/26/18 21:46 Dose:  50 mg


Insulin Human Lispro (Humalog)  0 units SC 0630,1630 [facility]


   PRN Reason: Protocol


   Last Admin: 05/26/18 16:30 Dose:  Not Given


Lactulose (Enulose)  10 gm PO DAILY PRN


   PRN Reason: Constipation


Levothyroxine Sodium (Synthroid)  25 mcg PO 0630 [facility]


   Last Admin: 05/26/18 06:12 Dose:  25 mcg


Lorazepam (Ativan)  0.5 mg PO HS PRN


   PRN Reason: Insomnia


   Last Admin: 05/26/18 21:46 Dose:  0.5 mg


Losartan Potassium (Cozaar)  50 mg PO DAILY [facility]


   Last Admin: 05/26/18 08:42 Dose:  Not Given


Metformin HCl (Glucophage)  1,000 mg PO BIDWM [facility]


   Last Admin: 05/26/18 17:43 Dose:  1,000 mg


Metoprolol Tartrate (Lopressor)  100 mg PO Q12 [facility]


   Last Admin: 05/26/18 21:46 Dose:  100 mg


Nifedipine (Procardia Xl)  30 mg PO DAILY [facility]


   Last Admin: 05/26/18 08:43 Dose:  Not Given


Pantoprazole Sodium (Protonix Ec Tab)  40 mg PO 0630 [facility]


   Last Admin: 05/26/18 06:12 Dose:  40 mg


Sitagliptin Phosphate (Januvia)  100 mg PO DAILY [facility]


   Last Admin: 05/26/18 08:43 Dose:  100 mg


Tamsulosin HCl (Flomax)  0.4 mg PO HS [facility]


   Last Admin: 05/26/18 21:45 Dose:  0.4 mg


Tolvaptan (Samsca)  15 mg PO DAILY [facility]


   Last Admin: 05/26/18 14:34 Dose:  15 mg











- Labs


Labs: 


 





 05/24/18 08:07 





 05/26/18 06:00

## 2018-05-27 LAB
BUN SERPL-MCNC: 31 MG/DL (ref 7–17)
CALCIUM SERPL-MCNC: 9.3 MG/DL (ref 8.4–10.2)
ERYTHROCYTE [DISTWIDTH] IN BLOOD BY AUTOMATED COUNT: 14.1 % (ref 11.5–14.5)
GFR NON-AFRICAN AMERICAN: 53
HGB BLD-MCNC: 10.3 G/DL (ref 12–16)
MCH RBC QN AUTO: 30.3 PG (ref 27–31)
MCHC RBC AUTO-ENTMCNC: 33.2 G/DL (ref 33–37)
MCV RBC AUTO: 91.3 FL (ref 81–99)
PLATELET # BLD: 393 K/UL (ref 130–400)
RBC # BLD AUTO: 3.4 MIL/UL (ref 3.8–5.2)
WBC # BLD AUTO: 6.8 K/UL (ref 4.8–10.8)

## 2018-05-27 RX ADMIN — PANTOPRAZOLE SODIUM SCH MG: 40 TABLET, DELAYED RELEASE ORAL at 06:49

## 2018-05-27 RX ADMIN — INSULIN LISPRO SCH: 100 INJECTION, SOLUTION INTRAVENOUS; SUBCUTANEOUS at 16:23

## 2018-05-27 RX ADMIN — ENOXAPARIN SODIUM SCH MG: 40 INJECTION SUBCUTANEOUS at 13:14

## 2018-05-27 RX ADMIN — ALUMINUM HYDROXIDE, MAGNESIUM HYDROXIDE, AND SIMETHICONE PRN ML: 200; 200; 20 SUSPENSION ORAL at 11:38

## 2018-05-27 RX ADMIN — INSULIN LISPRO SCH: 100 INJECTION, SOLUTION INTRAVENOUS; SUBCUTANEOUS at 06:50

## 2018-05-27 RX ADMIN — NIFEDIPINE SCH MG: 30 TABLET, FILM COATED, EXTENDED RELEASE ORAL at 08:50

## 2018-05-28 RX ADMIN — ENOXAPARIN SODIUM SCH MG: 40 INJECTION SUBCUTANEOUS at 09:08

## 2018-05-28 RX ADMIN — NIFEDIPINE SCH MG: 30 TABLET, FILM COATED, EXTENDED RELEASE ORAL at 09:08

## 2018-05-28 RX ADMIN — PANTOPRAZOLE SODIUM SCH MG: 40 TABLET, DELAYED RELEASE ORAL at 05:50

## 2018-05-28 RX ADMIN — INSULIN LISPRO SCH: 100 INJECTION, SOLUTION INTRAVENOUS; SUBCUTANEOUS at 06:00

## 2018-05-28 RX ADMIN — INSULIN LISPRO SCH: 100 INJECTION, SOLUTION INTRAVENOUS; SUBCUTANEOUS at 16:35

## 2018-05-28 NOTE — CP.PCM.PN
Subjective





- Date & Time of Evaluation


Date of Evaluation: 05/26/18


Time of Evaluation: 15:00





- Subjective


Subjective: 





Patient remains stable


 Noted better glucose control.


Doing well with PT


Has no chest pain or SOB.





Objective





- Vital Signs/Intake and Output


Vital Signs (last 24 hours): 


 











Temp Pulse Resp BP Pulse Ox


 


 98.2 F   65   20   117/63   96 


 


 05/27/18 20:28  05/28/18 05:51  05/27/18 20:28  05/28/18 05:51  05/27/18 20:28











- Medications


Medications: 


 Current Medications





Acetaminophen (Tylenol 325mg Tab)  325 mg PO Q6 PRN


   PRN Reason: Pain 1-10


   Last Admin: 05/18/18 17:35 Dose:  325 mg


Al Hydrox/Mg Hydrox/Simethicone (Maalox Plus 30 Ml)  30 ml PO Q6 PRN


   PRN Reason: Indigestion / Heartburn


   Last Admin: 05/27/18 11:38 Dose:  30 ml


Aspirin (Aspirin Chewable)  81 mg PO DAILY [facility]


   Last Admin: 05/27/18 08:50 Dose:  81 mg


Atorvastatin Calcium (Lipitor)  40 mg PO HS [facility]


   Last Admin: 05/27/18 22:09 Dose:  40 mg


Clotrimazole (Lotrimin 1% Cream)  1 applic TOP 0600,1800 [facility]


   Last Admin: 05/28/18 06:01 Dose:  1 applic


Docusate Sodium (Colace)  100 mg PO HS [facility]


   Last Admin: 05/27/18 22:08 Dose:  100 mg


Enoxaparin Sodium (Lovenox)  40 mg SC DAILY [facility]


   PRN Reason: Protocol


   Last Admin: 05/27/18 13:14 Dose:  40 mg


Hydralazine HCl (Apresoline)  50 mg PO Q8 [facility]


   Last Admin: 05/28/18 05:51 Dose:  50 mg


Insulin Human Lispro (Humalog)  0 units SC 0630,1630 [facility]


   PRN Reason: Protocol


   Last Admin: 05/28/18 06:00 Dose:  Not Given


Lactulose (Enulose)  10 gm PO DAILY [facility]


   PRN Reason: Constipation


Levothyroxine Sodium (Synthroid)  25 mcg PO 0630 [facility]


   Last Admin: 05/28/18 05:50 Dose:  25 mcg


Lorazepam (Ativan)  0.5 mg PO HS PRN


   PRN Reason: Insomnia


   Last Admin: 05/27/18 21:58 Dose:  0.5 mg


Losartan Potassium (Cozaar)  50 mg PO DAILY [facility]


   Last Admin: 05/27/18 08:50 Dose:  50 mg


Metformin HCl (Glucophage)  1,000 mg PO BIDWM [facility]


   Last Admin: 05/27/18 16:22 Dose:  1,000 mg


Metoprolol Tartrate (Lopressor)  100 mg PO Q12 [facility]


   Last Admin: 05/27/18 21:52 Dose:  100 mg


Nifedipine (Procardia Xl)  30 mg PO DAILY [facility]


   Last Admin: 05/27/18 08:50 Dose:  30 mg


Pantoprazole Sodium (Protonix Ec Tab)  40 mg PO 0630 [facility]


   Last Admin: 05/28/18 05:50 Dose:  40 mg


Sitagliptin Phosphate (Januvia)  100 mg PO DAILY [facility]


   Last Admin: 05/27/18 08:49 Dose:  100 mg


Tamsulosin HCl (Flomax)  0.4 mg PO HS [facility]


   Last Admin: 05/27/18 21:59 Dose:  0.4 mg


Tolvaptan (Samsca)  15 mg PO DAILY [facility]


   Last Admin: 05/27/18 08:50 Dose:  15 mg











- Labs


Labs: 


 





 05/27/18 05:30 





 05/27/18 05:30 











- Head Exam


Head Exam: NORMAL INSPECTION





- Eye Exam


Eye Exam: Normal appearance





- ENT Exam


ENT Exam: Mucous Membranes Moist





- Respiratory Exam


Respiratory Exam: Clear to Ausculation Bilateral





- Cardiovascular Exam


Cardiovascular Exam: REGULAR RHYTHM





- GI/Abdominal Exam


GI & Abdominal Exam: Normal Bowel Sounds





- Neurological Exam


Neurological Exam: Awake, Oriented x3





- Psychiatric Exam


Psychiatric exam: Normal Mood





Assessment and Plan


(1) CVA (cerebral vascular accident)


Status: Acute   





(2) Gait abnormality


Status: Acute   





(3) Hypertension


Status: Acute   





(4) Diabetes mellitus type 2 in obese


Status: Acute   





(5) Hypothyroidism


Status: Acute   





- Assessment and Plan (Free Text)


Plan: 





Cont meds


 cont PT


 add statin


 cont BP meds


low salt diet

## 2018-05-28 NOTE — CP.PCM.PN
Subjective





- Date & Time of Evaluation


Date of Evaluation: 05/28/18


Time of Evaluation: 13:35





- Subjective


Subjective: 





Follow up Nephrology Consultation Note





Assessment: Stable


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


DM, HTN, hx of breast CA





Plan


BP controlled on meds as ordered


continue samsca 15 mg/day As her serum sodium continued to decrease without 

this medication


avoid correction in serum Na >6-8 meq/24 hrs


Glycemic control


Further work up for as per primary team





Physical Examination: 


General Appearance: Comfortable, in no acute respiratory distress, co-operative 

. 


Vitals reviewed and noted as below


Head; Atraumatic, normocephalic


ENT: no ulcers no thrush. Tongue is midline. Oropharynx: no rash or ulcers.


EYES: Sclera is anicteric.


Neck; supple 


Lungs: Normal respiratory rate/effort. Breath sounds bilateral equal and clear


Heart: Normal rate. s1s2 normal. No rub or gallop. 


Extremities: no edema. No varicose veins


Neurological: Patient is alert, awake and oriented to person, place and time. 

has rt side weakness and slurred speech


Skin: Warm and dry 


Abdomen: Abdomen is soft. Bowel sounds +. There is no abdominal tenderness, no 

guarding/rigidity no organomegaly


Psych: normal insight and normal affect/mood


MSK: no joint tenderness 





Objective





- Vital Signs/Intake and Output


Vital Signs (last 24 hours): 


 











Temp Pulse Resp BP Pulse Ox


 


 97.9 F   66   18   114/56 L  98 


 


 05/28/18 08:01  05/28/18 09:10  05/28/18 08:01  05/28/18 09:10  05/28/18 08:01











- Medications


Medications: 


 Current Medications





Acetaminophen (Tylenol 325mg Tab)  325 mg PO Q6 PRN


   PRN Reason: Pain 1-10


   Last Admin: 05/18/18 17:35 Dose:  325 mg


Al Hydrox/Mg Hydrox/Simethicone (Maalox Plus 30 Ml)  30 ml PO Q6 PRN


   PRN Reason: Indigestion / Heartburn


   Last Admin: 05/27/18 11:38 Dose:  30 ml


Aspirin (Aspirin Chewable)  81 mg PO DAILY [facility]


   Last Admin: 05/28/18 09:07 Dose:  81 mg


Atorvastatin Calcium (Lipitor)  40 mg PO HS [facility]


   Last Admin: 05/27/18 22:09 Dose:  40 mg


Clotrimazole (Lotrimin 1% Cream)  1 applic TOP 0600,1800 [facility]


   Last Admin: 05/28/18 06:01 Dose:  1 applic


Docusate Sodium (Colace)  100 mg PO HS [facility]


   Last Admin: 05/27/18 22:08 Dose:  100 mg


Enoxaparin Sodium (Lovenox)  40 mg SC DAILY [facility]


   PRN Reason: Protocol


   Last Admin: 05/28/18 09:08 Dose:  40 mg


Hydralazine HCl (Apresoline)  50 mg PO Q8 [facility]


   Last Admin: 05/28/18 05:51 Dose:  50 mg


Insulin Human Lispro (Humalog)  0 units SC 0630,1630 [facility]


   PRN Reason: Protocol


   Last Admin: 05/28/18 06:00 Dose:  Not Given


Lactulose (Enulose)  10 gm PO DAILY [facility]


   PRN Reason: Constipation


Levothyroxine Sodium (Synthroid)  25 mcg PO 0630 [facility]


   Last Admin: 05/28/18 05:50 Dose:  25 mcg


Lorazepam (Ativan)  0.5 mg PO HS [facility]


   PRN Reason: Insomnia


   Last Admin: 05/27/18 21:58 Dose:  0.5 mg


Losartan Potassium (Cozaar)  50 mg PO DAILY [facility]


   Last Admin: 05/28/18 09:10 Dose:  50 mg


Metformin HCl (Glucophage)  1,000 mg PO BIDWM [facility]


   Last Admin: 05/28/18 09:07 Dose:  1,000 mg


Metoprolol Tartrate (Lopressor)  100 mg PO Q12 [facility]


   Last Admin: 05/28/18 09:07 Dose:  100 mg


Nifedipine (Procardia Xl)  30 mg PO DAILY [facility]


   Last Admin: 05/28/18 09:08 Dose:  30 mg


Pantoprazole Sodium (Protonix Ec Tab)  40 mg PO 0630 [facility]


   Last Admin: 05/28/18 05:50 Dose:  40 mg


Sitagliptin Phosphate (Januvia)  100 mg PO DAILY [facility]


   Last Admin: 05/28/18 09:08 Dose:  100 mg


Tamsulosin HCl (Flomax)  0.4 mg PO HS [facility]


   Last Admin: 05/27/18 21:59 Dose:  0.4 mg


Tolvaptan (Samsca)  15 mg PO DAILY [facility]


   Last Admin: 05/28/18 09:07 Dose:  15 mg











- Labs


Labs: 


 





 05/27/18 05:30 





 05/27/18 05:30

## 2018-05-28 NOTE — CP.PCM.PN
Subjective





- Date & Time of Evaluation


Date of Evaluation: 05/27/18


Time of Evaluation: 13:00





- Subjective


Subjective: 





Patient remains stable


Has no chest pain or SOB


 Afebrile.


Doing well with PT





Objective





- Vital Signs/Intake and Output


Vital Signs (last 24 hours): 


 











Temp Pulse Resp BP Pulse Ox


 


 98.2 F   65   20   117/63   96 


 


 05/27/18 20:28  05/28/18 05:51  05/27/18 20:28  05/28/18 05:51  05/27/18 20:28











- Medications


Medications: 


 Current Medications





Acetaminophen (Tylenol 325mg Tab)  325 mg PO Q6 PRN


   PRN Reason: Pain 1-10


   Last Admin: 05/18/18 17:35 Dose:  325 mg


Al Hydrox/Mg Hydrox/Simethicone (Maalox Plus 30 Ml)  30 ml PO Q6 PRN


   PRN Reason: Indigestion / Heartburn


   Last Admin: 05/27/18 11:38 Dose:  30 ml


Aspirin (Aspirin Chewable)  81 mg PO DAILY [facility]


   Last Admin: 05/27/18 08:50 Dose:  81 mg


Atorvastatin Calcium (Lipitor)  40 mg PO HS [facility]


   Last Admin: 05/27/18 22:09 Dose:  40 mg


Clotrimazole (Lotrimin 1% Cream)  1 applic TOP 0600,1800 [facility]


   Last Admin: 05/28/18 06:01 Dose:  1 applic


Docusate Sodium (Colace)  100 mg PO HS [facility]


   Last Admin: 05/27/18 22:08 Dose:  100 mg


Enoxaparin Sodium (Lovenox)  40 mg SC DAILY [facility]


   PRN Reason: Protocol


   Last Admin: 05/27/18 13:14 Dose:  40 mg


Hydralazine HCl (Apresoline)  50 mg PO Q8 [facility]


   Last Admin: 05/28/18 05:51 Dose:  50 mg


Insulin Human Lispro (Humalog)  0 units SC 0630,1630 [facility]


   PRN Reason: Protocol


   Last Admin: 05/28/18 06:00 Dose:  Not Given


Lactulose (Enulose)  10 gm PO DAILY [facility]


   PRN Reason: Constipation


Levothyroxine Sodium (Synthroid)  25 mcg PO 0630 [facility]


   Last Admin: 05/28/18 05:50 Dose:  25 mcg


Lorazepam (Ativan)  0.5 mg PO HS [facility]


   PRN Reason: Insomnia


   Last Admin: 05/27/18 21:58 Dose:  0.5 mg


Losartan Potassium (Cozaar)  50 mg PO DAILY [facility]


   Last Admin: 05/27/18 08:50 Dose:  50 mg


Metformin HCl (Glucophage)  1,000 mg PO BIDWM [facility]


   Last Admin: 05/27/18 16:22 Dose:  1,000 mg


Metoprolol Tartrate (Lopressor)  100 mg PO Q12 [facility]


   Last Admin: 05/27/18 21:52 Dose:  100 mg


Nifedipine (Procardia Xl)  30 mg PO DAILY [facility]


   Last Admin: 05/27/18 08:50 Dose:  30 mg


Pantoprazole Sodium (Protonix Ec Tab)  40 mg PO 0630 [facility]


   Last Admin: 05/28/18 05:50 Dose:  40 mg


Sitagliptin Phosphate (Januvia)  100 mg PO DAILY [facility]


   Last Admin: 05/27/18 08:49 Dose:  100 mg


Tamsulosin HCl (Flomax)  0.4 mg PO HS [facility]


   Last Admin: 05/27/18 21:59 Dose:  0.4 mg


Tolvaptan (Samsca)  15 mg PO DAILY [facility]


   Last Admin: 05/27/18 08:50 Dose:  15 mg











- Labs


Labs: 


 





 05/27/18 05:30 





 05/27/18 05:30 











- Head Exam


Head Exam: NORMAL INSPECTION





- Eye Exam


Eye Exam: Normal appearance





- ENT Exam


ENT Exam: Mucous Membranes Moist





- Respiratory Exam


Respiratory Exam: Clear to Ausculation Bilateral





- Cardiovascular Exam


Cardiovascular Exam: REGULAR RHYTHM





- GI/Abdominal Exam


GI & Abdominal Exam: Normal Bowel Sounds





- Neurological Exam


Neurological Exam: Awake, Oriented x3





- Psychiatric Exam


Psychiatric exam: Normal Mood





Assessment and Plan


(1) CVA (cerebral vascular accident)


Status: Acute   





(2) Gait abnormality


Status: Acute   





(3) Hypertension


Status: Acute   





(4) Diabetes mellitus type 2 in obese


Status: Acute   





(5) Hypothyroidism


Status: Acute   





- Assessment and Plan (Free Text)


Plan: 





Cont meds


 add statin to regimen


Cont PT


 Cont meds

## 2018-05-29 RX ADMIN — NIFEDIPINE SCH MG: 30 TABLET, FILM COATED, EXTENDED RELEASE ORAL at 08:59

## 2018-05-29 RX ADMIN — ALUMINUM HYDROXIDE, MAGNESIUM HYDROXIDE, AND SIMETHICONE PRN ML: 200; 200; 20 SUSPENSION ORAL at 08:55

## 2018-05-29 RX ADMIN — INSULIN LISPRO SCH: 100 INJECTION, SOLUTION INTRAVENOUS; SUBCUTANEOUS at 06:49

## 2018-05-29 RX ADMIN — INSULIN LISPRO SCH U: 100 INJECTION, SOLUTION INTRAVENOUS; SUBCUTANEOUS at 17:00

## 2018-05-29 RX ADMIN — ENOXAPARIN SODIUM SCH MG: 40 INJECTION SUBCUTANEOUS at 08:58

## 2018-05-29 RX ADMIN — PANTOPRAZOLE SODIUM SCH MG: 40 TABLET, DELAYED RELEASE ORAL at 06:34

## 2018-05-29 RX ADMIN — ALUMINUM HYDROXIDE, MAGNESIUM HYDROXIDE, AND SIMETHICONE PRN ML: 200; 200; 20 SUSPENSION ORAL at 21:49

## 2018-05-29 NOTE — CP.PCM.PN
Subjective





- Date & Time of Evaluation


Date of Evaluation: 05/29/18


Time of Evaluation: 13:33





- Subjective


Subjective: 





Patient is stable


No nausea or vomiting


No chest pain





Objective





- Vital Signs/Intake and Output


Vital Signs (last 24 hours): 


 











Temp Pulse Resp BP Pulse Ox


 


 97.9 F   67   18   119/78   97 


 


 05/29/18 07:58  05/29/18 08:59  05/29/18 07:58  05/29/18 08:59  05/29/18 07:58











- Medications


Medications: 


 Current Medications





Acetaminophen (Tylenol 325mg Tab)  325 mg PO Q6 PRN


   PRN Reason: Pain 1-10


   Last Admin: 05/18/18 17:35 Dose:  325 mg


Al Hydrox/Mg Hydrox/Simethicone (Maalox Plus 30 Ml)  30 ml PO Q6 PRN


   PRN Reason: Indigestion / Heartburn


   Last Admin: 05/29/18 08:55 Dose:  30 ml


Aspirin (Aspirin Chewable)  81 mg PO DAILY [facility]


   Last Admin: 05/29/18 08:58 Dose:  81 mg


Atorvastatin Calcium (Lipitor)  40 mg PO HS [facility]


   Last Admin: 05/28/18 21:27 Dose:  40 mg


Clotrimazole (Lotrimin 1% Cream)  1 applic TOP 0600,1800 [facility]


   Last Admin: 05/29/18 07:13 Dose:  1 applic


Docusate Sodium (Colace)  100 mg PO HS [facility]


   Last Admin: 05/28/18 21:28 Dose:  100 mg


Enoxaparin Sodium (Lovenox)  40 mg SC DAILY [facility]


   PRN Reason: Protocol


   Last Admin: 05/29/18 08:58 Dose:  40 mg


Hydralazine HCl (Apresoline)  50 mg PO Q8 [facility]


   Last Admin: 05/29/18 06:34 Dose:  50 mg


Insulin Human Lispro (Humalog)  0 units SC 0630,1630 [facility]


   PRN Reason: Protocol


   Last Admin: 05/29/18 06:49 Dose:  Not Given


Lactulose (Enulose)  10 gm PO DAILY [facility]


   PRN Reason: Constipation


Levothyroxine Sodium (Synthroid)  25 mcg PO 0630 [facility]


   Last Admin: 05/29/18 06:34 Dose:  25 mcg


Lorazepam (Ativan)  0.5 mg PO HS [facility]


   PRN Reason: Insomnia


   Last Admin: 05/27/18 21:58 Dose:  0.5 mg


Losartan Potassium (Cozaar)  50 mg PO DAILY [facility]


   Last Admin: 05/29/18 08:58 Dose:  50 mg


Metformin HCl (Glucophage)  1,000 mg PO BIDWM [facility]


   Last Admin: 05/29/18 08:58 Dose:  1,000 mg


Metoprolol Tartrate (Lopressor)  100 mg PO Q12 [facility]


   Last Admin: 05/29/18 08:57 Dose:  100 mg


Nifedipine (Procardia Xl)  30 mg PO DAILY [facility]


   Last Admin: 05/29/18 08:59 Dose:  30 mg


Pantoprazole Sodium (Protonix Ec Tab)  40 mg PO 0630 [facility]


   Last Admin: 05/29/18 06:34 Dose:  40 mg


Sitagliptin Phosphate (Januvia)  100 mg PO DAILY [facility]


   Last Admin: 05/29/18 08:58 Dose:  100 mg


Tamsulosin HCl (Flomax)  0.4 mg PO HS [facility]


   Last Admin: 05/28/18 21:28 Dose:  0.4 mg


Tolvaptan (Samsca)  15 mg PO DAILY [facility]


   Last Admin: 05/29/18 09:00 Dose:  15 mg











- Labs


Labs: 


 





 05/27/18 05:30 





 05/27/18 05:30 











- Constitutional


Appears: No Acute Distress





- ENT Exam


ENT Exam: Mucous Membranes Moist





- Neck Exam


Neck Exam: absent: Lymphadenopathy





- Respiratory Exam


Respiratory Exam: absent: Chest Wall Tenderness





- Cardiovascular Exam


Cardiovascular Exam: REGULAR RHYTHM.  absent: Gallop, JVD





- GI/Abdominal Exam


GI & Abdominal Exam: Soft, Normal Bowel Sounds





- Extremities Exam


Extremities Exam: absent: Calf Tenderness





- Back Exam


Back Exam: absent: CVA tenderness (L), CVA tenderness (R)





- Neurological Exam


Neurological Exam: Alert





- Psychiatric Exam


Psychiatric exam: Normal Affect





- Skin


Skin Exam: absent: Cyanosis





Assessment and Plan


(1) Hyponatremia


Assessment & Plan: 


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


DM, HTN, hx of breast CA





Plan


serum sodium ranging between 153-135 which has been stable on Samsca 15 mg daily





if serum sodium continue to drop.father , then will increase Samsca to 30 mg 

daily from 15mg


BP controlled on meds as ordered


continue daily samsca, na is stable


Glycemic control


Status: Acute

## 2018-05-29 NOTE — CP.PCM.PN
Subjective





- Date & Time of Evaluation


Date of Evaluation: 05/28/18


Time of Evaluation: 14:00





- Subjective


Subjective: 





Patient remains stable


Has no fever


BP has been well controlled.





Objective





- Vital Signs/Intake and Output


Vital Signs (last 24 hours): 


 











Temp Pulse Resp BP Pulse Ox


 


 98.1 F   89   20   112/60   98 


 


 05/28/18 20:00  05/28/18 21:30  05/28/18 20:00  05/28/18 21:30  05/28/18 20:00











- Medications


Medications: 


 Current Medications





Acetaminophen (Tylenol 325mg Tab)  325 mg PO Q6 PRN


   PRN Reason: Pain 1-10


   Last Admin: 05/18/18 17:35 Dose:  325 mg


Al Hydrox/Mg Hydrox/Simethicone (Maalox Plus 30 Ml)  30 ml PO Q6 PRN


   PRN Reason: Indigestion / Heartburn


   Last Admin: 05/27/18 11:38 Dose:  30 ml


Aspirin (Aspirin Chewable)  [facility]ILY [facility]


   Last Admin: 05/28/18 09:07 Dose:  81 mg


Atorvastatin Calcium (Lipitor)  40 mg PO HS [facility]


   Last Admin: 05/28/18 21:27 Dose:  40 mg


Clotrimazole (Lotrimin 1% Cream)  1 applic TOP 0600,1800 [facility]


   Last Admin: 05/28/18 16:59 Dose:  1 applic


Docusate Sodium (Colace)  100 mg PO HS [facility]


   Last Admin: 05/28/18 21:28 Dose:  100 mg


Enoxaparin Sodium (Lovenox)  40 mg SC DAILY [facility]


   PRN Reason: Protocol


   Last Admin: 05/28/18 09:08 Dose:  40 mg


Hydralazine HCl (Apresoline)  50 mg PO Q8 [facility]


   Last Admin: 05/28/18 21:30 Dose:  50 mg


Insulin Human Lispro (Humalog)  0 units SC 0630,1630 [facility]


   PRN Reason: Protocol


   Last Admin: 05/28/18 16:35 Dose:  Not Given


Lactulose (Enulose)  10 gm PO DAILY [facility]


   PRN Reason: Constipation


Levothyroxine Sodium (Synthroid)  25 mcg PO 0630 [facility]


   Last Admin: 05/28/18 05:50 Dose:  25 mcg


Lorazepam (Ativan)  0.5 mg PO HS [facility]


   PRN Reason: Insomnia


   Last Admin: 05/27/18 21:58 Dose:  0.5 mg


Losartan Potassium (Cozaar)  50 mg PO DAILY [facility]


   Last Admin: 05/28/18 09:10 Dose:  50 mg


Metformin HCl (Glucophage)  1,000 mg PO BIDWM [facility]


   Last Admin: 05/28/18 16:59 Dose:  1,000 mg


Metoprolol Tartrate (Lopressor)  100 mg PO Q12 [facility]


   Last Admin: 05/28/18 21:27 Dose:  100 mg


Nifedipine (Procardia Xl)  30 mg PO DAILY [facility]


   Last Admin: 05/28/18 09:08 Dose:  30 mg


Pantoprazole Sodium (Protonix Ec Tab)  40 mg PO 0630 [facility]


   Last Admin: 05/28/18 05:50 Dose:  40 mg


Sitagliptin Phosphate (Januvia)  100 mg PO DAILY [facility]


   Last Admin: 05/28/18 09:08 Dose:  100 mg


Tamsulosin HCl (Flomax)  0.4 mg PO HS [facility]


   Last Admin: 05/28/18 21:28 Dose:  0.4 mg


Tolvaptan (Samsca)  15 mg PO DAILY [facility]


   Last Admin: 05/28/18 09:07 Dose:  15 mg











- Labs


Labs: 


 





 05/27/18 05:30 





 05/27/18 05:30 











- Head Exam


Head Exam: NORMAL INSPECTION





- Eye Exam


Eye Exam: Normal appearance





- ENT Exam


ENT Exam: Mucous Membranes Moist





- Respiratory Exam


Respiratory Exam: Clear to Ausculation Bilateral





- Cardiovascular Exam


Cardiovascular Exam: REGULAR RHYTHM





- GI/Abdominal Exam


GI & Abdominal Exam: Normal Bowel Sounds





- Neurological Exam


Neurological Exam: Awake, Oriented x3





Assessment and Plan


(1) CVA (cerebral vascular accident)


Status: Acute   





(2) Gait abnormality


Status: Acute   





(3) Hypertension


Status: Acute   





(4) Diabetes mellitus type 2 in obese


Status: Acute   





(5) Hypothyroidism


Status: Acute   





- Assessment and Plan (Free Text)


Plan: 





Cont meds


 cont PT


 cont meds

## 2018-05-29 NOTE — CP.PCM.PN
Subjective





- Date & Time of Evaluation


Date of Evaluation: 05/29/18


Time of Evaluation: 07:15





- Subjective


Subjective: 





Patient seen and examined bedside with Dr Hernandez, improving with PT. She denies 

fever, chest pain, SOB, new focal motor deficit, numbness














Objective





- Vital Signs/Intake and Output


Vital Signs (last 24 hours): 


 











Temp Pulse Resp BP Pulse Ox


 


 97.9 F   67   18   119/78   97 


 


 05/29/18 07:58  05/29/18 08:59  05/29/18 07:58  05/29/18 08:59  05/29/18 07:58











- Medications


Medications: 


 Current Medications





Acetaminophen (Tylenol 325mg Tab)  325 mg PO Q6 PRN


   PRN Reason: Pain 1-10


   Last Admin: 05/18/18 17:35 Dose:  325 mg


Al Hydrox/Mg Hydrox/Simethicone (Maalox Plus 30 Ml)  30 ml PO Q6 PRN


   PRN Reason: Indigestion / Heartburn


   Last Admin: 05/29/18 08:55 Dose:  30 ml


Aspirin (Aspirin Chewable)  81 mg PO DAILY [facility]


   Last Admin: 05/29/18 08:58 Dose:  81 mg


Atorvastatin Calcium (Lipitor)  40 mg PO HS [facility]


   Last Admin: 05/28/18 21:27 Dose:  40 mg


Clotrimazole (Lotrimin 1% Cream)  1 applic TOP 0600,1800 [facility]


   Last Admin: 05/29/18 07:13 Dose:  1 applic


Docusate Sodium (Colace)  100 mg PO HS [facility]


   Last Admin: 05/28/18 21:28 Dose:  100 mg


Enoxaparin Sodium (Lovenox)  40 mg SC DAILY [facility]


   PRN Reason: Protocol


   Last Admin: 05/29/18 08:58 Dose:  40 mg


Hydralazine HCl (Apresoline)  50 mg PO Q8 [facility]


   Last Admin: 05/29/18 06:34 Dose:  50 mg


Insulin Human Lispro (Humalog)  0 units SC 0630,1630 [facility]


   PRN Reason: Protocol


   Last Admin: 05/29/18 06:49 Dose:  Not Given


Lactulose (Enulose)  10 gm PO DAILY PRN


   PRN Reason: Constipation


Levothyroxine Sodium (Synthroid)  25 mcg PO 0630 [facility]


   Last Admin: 05/29/18 06:34 Dose:  25 mcg


Lorazepam (Ativan)  0.5 mg PO HS PRN


   PRN Reason: Insomnia


   Last Admin: 05/27/18 21:58 Dose:  0.5 mg


Losartan Potassium (Cozaar)  50 mg PO DAILY [facility]


   Last Admin: 05/29/18 08:58 Dose:  50 mg


Metformin HCl (Glucophage)  1,000 mg PO BIDWM [facility]


   Last Admin: 05/29/18 08:58 Dose:  1,000 mg


Metoprolol Tartrate (Lopressor)  100 mg PO Q12 [facility]


   Last Admin: 05/29/18 08:57 Dose:  100 mg


Nifedipine (Procardia Xl)  30 mg PO DAILY [facility]


   Last Admin: 05/29/18 08:59 Dose:  30 mg


Pantoprazole Sodium (Protonix Ec Tab)  40 mg PO 0630 [facility]


   Last Admin: 05/29/18 06:34 Dose:  40 mg


Sitagliptin Phosphate (Januvia)  100 mg PO DAILY [facility]


   Last Admin: 05/29/18 08:58 Dose:  100 mg


Tamsulosin HCl (Flomax)  0.4 mg PO HS [facility]


   Last Admin: 05/28/18 21:28 Dose:  0.4 mg


Tolvaptan (Samsca)  15 mg PO DAILY [facility]


   Last Admin: 05/29/18 09:00 Dose:  15 mg











- Labs


Labs: 


 





 05/27/18 05:30 





 05/27/18 05:30 











- Constitutional


Appears: Non-toxic, No Acute Distress





- Head Exam


Head Exam: ATRAUMATIC, NORMOCEPHALIC





- Eye Exam


Eye Exam: Normal appearance





- ENT Exam


ENT Exam: Mucous Membranes Moist





- Respiratory Exam


Respiratory Exam: Clear to Ausculation Bilateral.  absent: Rhonchi, Wheezes





- Cardiovascular Exam


Cardiovascular Exam: REGULAR RHYTHM, +S1, +S2





- GI/Abdominal Exam


GI & Abdominal Exam: Soft, Normal Bowel Sounds.  absent: Tenderness





- Extremities Exam


Extremities Exam: Full ROM, Normal Inspection.  absent: Pedal Edema





- Neurological Exam


Neurological Exam: Alert, Awake


Neuro motor strength exam: Left Upper Extremity: 5, Right Upper Extremity: 3, 

Left Lower Extremity: 5, Right Lower Extremity: 3





- Psychiatric Exam


Psychiatric exam: Normal Affect, Normal Mood





- Skin


Skin Exam: Intact





Assessment and Plan





- Assessment and Plan (Free Text)


Plan: 





Assessment/Plan 





1) CVA


-chronic with residual right hemiparesis


-record on paper chart: MRI brain left pontine infarct. 2 right sphenoid 

meningiomas. 


-Pt/OT


-speech therapy 





2) DM


hga1c 7.3


-Controlled for her age


-metformin 1000 BID


-Januvia 100 mg daily 





3) HTN


-c/w losartan,


Metoprolol Tartrate 100 mg BID





4) HLD


c/w Lipitor 40 mg daily 








5) Hypothyroidism


c/w home medications





6) Hyponatremia


- secondary to SIADH caused for medication 


-Nephrology consult appreciated: continue daily samsca, 


-f/u CMP








7) DVT prophylaxis


-Lovenox 40 mg sc daily

## 2018-05-30 LAB
BUN SERPL-MCNC: 32 MG/DL (ref 7–17)
CALCIUM SERPL-MCNC: 9.7 MG/DL (ref 8.4–10.2)
ERYTHROCYTE [DISTWIDTH] IN BLOOD BY AUTOMATED COUNT: 14.3 % (ref 11.5–14.5)
GFR NON-AFRICAN AMERICAN: 53
HGB BLD-MCNC: 10.8 G/DL (ref 12–16)
MCH RBC QN AUTO: 30.9 PG (ref 27–31)
MCHC RBC AUTO-ENTMCNC: 34 G/DL (ref 33–37)
MCV RBC AUTO: 90.8 FL (ref 81–99)
PLATELET # BLD: 454 K/UL (ref 130–400)
RBC # BLD AUTO: 3.5 MIL/UL (ref 3.8–5.2)
WBC # BLD AUTO: 7.1 K/UL (ref 4.8–10.8)

## 2018-05-30 RX ADMIN — NIFEDIPINE SCH MG: 30 TABLET, FILM COATED, EXTENDED RELEASE ORAL at 08:33

## 2018-05-30 RX ADMIN — INSULIN LISPRO SCH: 100 INJECTION, SOLUTION INTRAVENOUS; SUBCUTANEOUS at 06:13

## 2018-05-30 RX ADMIN — PANTOPRAZOLE SODIUM SCH MG: 40 TABLET, DELAYED RELEASE ORAL at 06:06

## 2018-05-30 RX ADMIN — INSULIN LISPRO SCH: 100 INJECTION, SOLUTION INTRAVENOUS; SUBCUTANEOUS at 17:09

## 2018-05-30 RX ADMIN — ENOXAPARIN SODIUM SCH MG: 40 INJECTION SUBCUTANEOUS at 08:32

## 2018-05-30 NOTE — PN
DATE:  05/30/2018



PHYSIATRY PROGRESS NOTE



SUBJECTIVE:  The patient is feeling fine.  No acute complaints at present.



PHYSICAL EXAMINATION:

VITAL SIGNS:  Stable.

NECK:  Supple.

CHEST:  Symmetrical.

HEART:  Sounds S1 and S2.

ABDOMEN:  Area is benign.

EXTREMITIES:  No clubbing, cyanosis, or edema.  The patient regaining

increase strength in the right arm and in the right leg.



DIAGNOSES:  Acute cerebrovascular accident, right hemiparesis, history of

hypertension, diabetes, and hypothyroidism.



There is return of strength.  The patient is very motivated.  The patient

will definitely benefit from additional inpatient rehab stay.  The patient

was living alone before and is making gains.





__________________________________________

Bucky Mckeon MD





DD:  05/30/2018 14:12:58

DT:  05/30/2018 15:15:24

Job # [id number]

## 2018-05-30 NOTE — CP.PCM.PN
Subjective





- Date & Time of Evaluation


Date of Evaluation: 05/30/18


Time of Evaluation: 07:20





- Subjective


Subjective: 








Patient seen and examined bedside with Dr Hernandez, improving with PT. She denies 

fever, chest pain, SOB, new focal motor deficit, numbness








Objective





- Vital Signs/Intake and Output


Vital Signs (last 24 hours): 


 











Temp Pulse Resp BP Pulse Ox


 


 97.5 F L  67   18   123/66   98 


 


 05/30/18 07:37  05/30/18 08:33  05/30/18 07:37  05/30/18 08:33  05/30/18 07:37











- Medications


Medications: 


 Current Medications





Acetaminophen (Tylenol 325mg Tab)  325 mg PO Q6 PRN


   PRN Reason: Pain 1-10


   Last Admin: 05/18/18 17:35 Dose:  325 mg


Al Hydrox/Mg Hydrox/Simethicone (Maalox Plus 30 Ml)  30 ml PO Q6 PRN


   PRN Reason: Indigestion / Heartburn


   Last Admin: 05/29/18 21:49 Dose:  30 ml


Aspirin (Aspirin Chewable)  81 mg PO DAILY [facility]


   Last Admin: 05/30/18 08:32 Dose:  81 mg


Atorvastatin Calcium (Lipitor)  40 mg PO HS [facility]


   Last Admin: 05/29/18 21:42 Dose:  40 mg


Clotrimazole (Lotrimin 1% Cream)  1 applic TOP 0600,1800 [facility]


   Last Admin: 05/30/18 06:05 Dose:  1 applic


Docusate Sodium (Colace)  100 mg PO HS [facility]


   Last Admin: 05/29/18 21:41 Dose:  100 mg


Enoxaparin Sodium (Lovenox)  40 mg SC DAILY [facility]


   PRN Reason: Protocol


   Last Admin: 05/30/18 08:32 Dose:  40 mg


Hydralazine HCl (Apresoline)  50 mg PO Q8 [facility]


   Last Admin: 05/30/18 06:05 Dose:  50 mg


Insulin Human Lispro (Humalog)  0 units SC 0630,1630 [facility]


   PRN Reason: Protocol


   Last Admin: 05/30/18 06:13 Dose:  Not Given


Lactulose (Enulose)  10 gm PO DAILY PRN


   PRN Reason: Constipation


Levothyroxine Sodium (Synthroid)  25 mcg PO 0630 [facility]


   Last Admin: 05/30/18 06:06 Dose:  25 mcg


Lorazepam (Ativan)  0.5 mg PO HS PRN


   PRN Reason: Insomnia


   Last Admin: 05/29/18 21:42 Dose:  0.5 mg


Losartan Potassium (Cozaar)  50 mg PO DAILY [facility]


   Last Admin: 05/30/18 08:32 Dose:  50 mg


Metformin HCl (Glucophage)  1,000 mg PO BIDWM [facility]


   Last Admin: 05/30/18 08:33 Dose:  1,000 mg


Metoprolol Tartrate (Lopressor)  100 mg PO Q12 [facility]


   Last Admin: 05/30/18 08:33 Dose:  100 mg


Nifedipine (Procardia Xl)  30 mg PO DAILY [facility]


   Last Admin: 05/30/18 08:33 Dose:  30 mg


Pantoprazole Sodium (Protonix Ec Tab)  40 mg PO 0630 [facility]


   Last Admin: 05/30/18 06:06 Dose:  40 mg


Sitagliptin Phosphate (Januvia)  100 mg PO DAILY [facility]


   Last Admin: 05/30/18 08:33 Dose:  100 mg


Tamsulosin HCl (Flomax)  0.4 mg PO HS [facility]


   Last Admin: 05/29/18 21:42 Dose:  0.4 mg


Tolvaptan (Samsca)  15 mg PO DAILY [facility]


   Last Admin: 05/30/18 08:34 Dose:  15 mg











- Labs


Labs: 


 





 05/30/18 06:30 





 05/30/18 06:30 











- Constitutional


Appears: Non-toxic, No Acute Distress





- Head Exam


Head Exam: ATRAUMATIC, NORMOCEPHALIC





- Eye Exam


Eye Exam: Normal appearance





- ENT Exam


ENT Exam: Mucous Membranes Moist





- Respiratory Exam


Respiratory Exam: Clear to Ausculation Bilateral





- Cardiovascular Exam


Cardiovascular Exam: REGULAR RHYTHM, +S1, +S2





- GI/Abdominal Exam


GI & Abdominal Exam: Soft, Normal Bowel Sounds





- Neurological Exam


Neurological Exam: Alert, Awake


Neuro motor strength exam: Left Upper Extremity: 5, Right Upper Extremity: 3, 

Left Lower Extremity: 5, Right Lower Extremity: 3





- Psychiatric Exam


Psychiatric exam: Normal Affect, Normal Mood





- Skin


Skin Exam: Intact





Assessment and Plan





- Assessment and Plan (Free Text)


Plan: 





Assessment/Plan 





1) CVA


-chronic with residual right hemiparesis


-record on paper chart: MRI brain left pontine infarct. 2 right sphenoid 

meningioma. 


-Pt/OT


-speech therapy 





2) DM


hga1c 7.3


-Controlled for her age


-metformin 1000 BID


-Januvia 100 mg daily 





3) HTN


-c/w losartan,


Metoprolol Tartrate 100 mg BID





4) HLD


c/w Lipitor 40 mg daily 








5) Hypothyroidism


c/w home medications





6) Hyponatremia


- secondary to SIADH caused for medication 


-Nephrology consult appreciated: continue daily samsca, 


-f/u CMP








7) DVT prophylaxis


-Lovenox 40 mg sc daily

## 2018-05-30 NOTE — CP.PCM.PN
Subjective





- Date & Time of Evaluation


Date of Evaluation: 05/30/18


Time of Evaluation: 13:02





- Subjective


Subjective: 





Patient is clinically stable no significant changing receiving physiotherapy





Objective





- Vital Signs/Intake and Output


Vital Signs (last 24 hours): 


 











Temp Pulse Resp BP Pulse Ox


 


 97.5 F L  67   18   123/66   98 


 


 05/30/18 07:37  05/30/18 08:33  05/30/18 07:37  05/30/18 08:33  05/30/18 07:37











- Medications


Medications: 


 Current Medications





Acetaminophen (Tylenol 325mg Tab)  325 mg PO Q6 PRN


   PRN Reason: Pain 1-10


   Last Admin: 05/18/18 17:35 Dose:  325 mg


Al Hydrox/Mg Hydrox/Simethicone (Maalox Plus 30 Ml)  30 ml PO Q6 PRN


   PRN Reason: Indigestion / Heartburn


   Last Admin: 05/29/18 21:49 Dose:  30 ml


Aspirin (Aspirin Chewable)  81 mg PO DAILY [facility]


   Last Admin: 05/30/18 08:32 Dose:  81 mg


Atorvastatin Calcium (Lipitor)  40 mg PO HS [facility]


   Last Admin: 05/29/18 21:42 Dose:  40 mg


Clotrimazole (Lotrimin 1% Cream)  1 applic TOP 0600,1800 [facility]


   Last Admin: 05/30/18 06:05 Dose:  1 applic


Docusate Sodium (Colace)  100 mg PO HS [facility]


   Last Admin: 05/29/18 21:41 Dose:  100 mg


Enoxaparin Sodium (Lovenox)  40 mg SC DAILY [facility]


   PRN Reason: Protocol


   Last Admin: 05/30/18 08:32 Dose:  40 mg


Hydralazine HCl (Apresoline)  50 mg PO Q8 [facility]


   Last Admin: 05/30/18 06:05 Dose:  50 mg


Insulin Human Lispro (Humalog)  0 units SC 0630,1630 [facility]


   PRN Reason: Protocol


   Last Admin: 05/30/18 06:13 Dose:  Not Given


Lactulose (Enulose)  10 gm PO DAILY [facility]


   PRN Reason: Constipation


Levothyroxine Sodium (Synthroid)  25 mcg PO 0630 [facility]


   Last Admin: 05/30/18 06:06 Dose:  25 mcg


Lorazepam (Ativan)  0.5 mg PO HS [facility]N


   PRN Reason: Insomnia


   Last Admin: 05/29/18 21:42 Dose:  0.5 mg


Losartan Potassium (Cozaar)  50 mg PO DAILY [facility]


   Last Admin: 05/30/18 08:32 Dose:  50 mg


Metformin HCl (Glucophage)  1,000 mg PO BIDWM [facility]


   Last Admin: 05/30/18 08:33 Dose:  1,000 mg


Metoprolol Tartrate (Lopressor)  100 mg PO Q12 [facility]


   Last Admin: 05/30/18 08:33 Dose:  100 mg


Nifedipine (Procardia Xl)  30 mg PO DAILY [facility]


   Last Admin: 05/30/18 08:33 Dose:  30 mg


Pantoprazole Sodium (Protonix Ec Tab)  40 mg PO 0630 [facility]


   Last Admin: 05/30/18 06:06 Dose:  40 mg


Sitagliptin Phosphate (Januvia)  100 mg PO DAILY [facility]


   Last Admin: 05/30/18 08:33 Dose:  100 mg


Tamsulosin HCl (Flomax)  0.4 mg PO HS [facility]


   Last Admin: 05/29/18 21:42 Dose:  0.4 mg


Tolvaptan (Samsca)  15 mg PO DAILY [facility]


   Last Admin: 05/30/18 08:34 Dose:  15 mg











- Labs


Labs: 


 





 05/30/18 06:30 





 05/30/18 06:30 











- Constitutional


Appears: No Acute Distress





- ENT Exam


ENT Exam: Mucous Membranes Moist





- Respiratory Exam


Respiratory Exam: NORMAL BREATHING PATTERN.  absent: Rales





- Cardiovascular Exam


Cardiovascular Exam: REGULAR RHYTHM.  absent: Gallop





- Extremities Exam


Extremities Exam: absent: Calf Tenderness





- Back Exam


Back Exam: absent: CVA tenderness (L), CVA tenderness (R)





- Neurological Exam


Neurological Exam: Alert





- Psychiatric Exam


Psychiatric exam: Normal Affect





- Skin


Skin Exam: absent: Cyanosis





Assessment and Plan


(1) Hyponatremia


Assessment & Plan: 


Hyponatremia related to SIADH


Serum sodium coming up to 138 today


Cut down Samsca 15 mg to 3 days a week Monday Wednesday Friday


Status: Acute

## 2018-05-30 NOTE — CP.PCM.PN
Subjective





- Date & Time of Evaluation


Date of Evaluation: 05/29/18


Time of Evaluation: 14:00





- Subjective


Subjective: 





no acute complaints of pain





Objective





- Vital Signs/Intake and Output


Vital Signs (last 24 hours): 


 











Temp Pulse Resp BP Pulse Ox


 


 97.5 F L  76   18   119/63   98 


 


 05/30/18 07:37  05/30/18 13:44  05/30/18 07:37  05/30/18 13:44  05/30/18 07:37











- Medications


Medications: 


 Current Medications





Acetaminophen (Tylenol 325mg Tab)  325 mg PO Q6 PRN


   PRN Reason: Pain 1-10


   Last Admin: 05/18/18 17:35 Dose:  325 mg


Al Hydrox/Mg Hydrox/Simethicone (Maalox Plus 30 Ml)  30 ml PO Q6 PRN


   PRN Reason: Indigestion / Heartburn


   Last Admin: 05/29/18 21:49 Dose:  30 ml


Aspirin (Aspirin Chewable)  81 mg PO DAILY [facility]


   Last Admin: 05/30/18 08:32 Dose:  81 mg


Atorvastatin Calcium (Lipitor)  40 mg PO HS [facility]


   Last Admin: 05/29/18 21:42 Dose:  40 mg


Clotrimazole (Lotrimin 1% Cream)  1 applic TOP 0600,1800 [facility]


   Last Admin: 05/30/18 06:05 Dose:  1 applic


Docusate Sodium (Colace)  100 mg PO HS [facility]


   Last Admin: 05/29/18 21:41 Dose:  100 mg


Enoxaparin Sodium (Lovenox)  40 mg SC DAILY [facility]


   PRN Reason: Protocol


   Last Admin: 05/30/18 08:32 Dose:  40 mg


Hydralazine HCl (Apresoline)  50 mg PO Q8 [facility]


   Last Admin: 05/30/18 13:44 Dose:  50 mg


Insulin Human Lispro (Humalog)  0 units SC 0630,1630 [facility]


   PRN Reason: Protocol


   Last Admin: 05/30/18 06:13 Dose:  Not Given


Lactulose (Enulose)  10 gm PO DAILY [facility]


   PRN Reason: Constipation


Levothyroxine Sodium (Synthroid)  25 mcg PO 0630 [facility]


   Last Admin: 05/30/18 06:06 Dose:  25 mcg


Lorazepam (Ativan)  0.5 mg PO HS [facility]


   PRN Reason: Insomnia


   Last Admin: 05/29/18 21:42 Dose:  0.5 mg


Losartan Potassium (Cozaar)  50 mg PO DAILY [facility]


   Last Admin: 05/30/18 08:32 Dose:  50 mg


Metformin HCl (Glucophage)  1,000 mg PO BIDWM [facility]


   Last Admin: 05/30/18 08:33 Dose:  1,000 mg


Metoprolol Tartrate (Lopressor)  100 mg PO Q12 [facility]


   Last Admin: 05/30/18 08:33 Dose:  100 mg


Nifedipine (Procardia Xl)  30 mg PO DAILY [facility]


   Last Admin: 05/30/18 08:33 Dose:  30 mg


Pantoprazole Sodium (Protonix Ec Tab)  40 mg PO 0630 [facility]


   Last Admin: 05/30/18 06:06 Dose:  40 mg


Sitagliptin Phosphate (Januvia)  100 mg PO DAILY [facility]


   Last Admin: 05/30/18 08:33 Dose:  100 mg


Tamsulosin HCl (Flomax)  0.4 mg PO HS [facility]


   Last Admin: 05/29/18 21:42 Dose:  0.4 mg


Tolvaptan (Samsca)  15 mg PO MWF [facility]


   Last Admin: 05/30/18 14:37 Dose:  Not Given











- Labs


Labs: 


 





 05/30/18 06:30 





 05/30/18 06:30 











- Head Exam


Head Exam: ATRAUMATIC, NORMAL INSPECTION, NORMOCEPHALIC





- Eye Exam


Eye Exam: EOMI, Normal appearance, PERRL


Pupil Exam: NORMAL ACCOMODATION





- ENT Exam


ENT Exam: Mucous Membranes Moist, Normal Exam





- Neck Exam


Neck Exam: Full ROM, Normal Inspection





- Respiratory Exam


Respiratory Exam: Clear to Ausculation Bilateral, NORMAL BREATHING PATTERN





- Cardiovascular Exam


Cardiovascular Exam: REGULAR RHYTHM





- GI/Abdominal Exam


GI & Abdominal Exam: Soft, Normal Bowel Sounds





- Rectal Exam


Rectal Exam: NORMAL INSPECTION





-  Exam


External exam: NORMAL EXTERNAL EXAM





- Extremities Exam


Extremities Exam: Full ROM, Normal Capillary Refill, Normal Inspection





- Back Exam


Back Exam: NORMAL INSPECTION





- Neurological Exam


Neurological Exam: Alert, Awake


Neuro motor strength exam: Left Upper Extremity: 2/1, Left Lower Extremity: 2/1





- Psychiatric Exam


Psychiatric exam: Normal Affect, Normal Mood





- Skin


Skin Exam: Dry, Intact





Assessment and Plan


(1) CVA (cerebral vascular accident)


Assessment & Plan: 


making improvement, pt, ot rec and speech therapy   increased strength


Status: Acute   





(2) Hyponatremia


Status: Acute   





(3) Anxiety


Status: Acute   





(4) Benign hypertension


Status: Acute   





(5) HTN (hypertension)


Status: Acute

## 2018-05-30 NOTE — PSY.TMCNF
Nursing





- Vital Signs


Vital Signs (Last 8 hours): 


 Vital Signs











  05/30/18 05/30/18 05/30/18





  06:05 07:37 08:32


 


Temperature  97.5 F L 


 


Pulse Rate 65 67 67


 


Respiratory  18 





Rate   


 


Blood Pressure 115/66 123/66 123/66


 


O2 Sat by Pulse  98 





Oximetry   














  05/30/18





  08:33


 


Temperature 


 


Pulse Rate 67


 


Respiratory 





Rate 


 


Blood Pressure 123/66


 


O2 Sat by Pulse 





Oximetry 











Pain: 0





- Precautions:


Precautions: Fall Prevention, Aspiration, Pressure Ulcer





- Medications/Other Issues


Comment: Pt is at moderate nutritional risk.  Goals-.  1.  Pt to consume 

% of meals (met, continue).  2.  Blood glucoses to be between  mg/dl(met, 

continue).  Follow-up assessment due by 6/5/2018.





- Consults


Comment: Dr. Guzman, Dr. Simpson-Gibilisco





- Toileting


Toileting: Dependent





- Bladder Management


Bladder Pattern: Incontinent


Voiding Method: Toilet, Bedpan, Diaper





- Bowel Management


Bowel Pattern: Incontinent


Bowel Management: Dependent


Frequency of Accidents: >5





- Transfers


Transfers: Maximal Assistance





- ADL's


ADL's: Maximal Assistance





- Pain Management


Comments: denies pain





- Patient/Family Teaching


Comments: N/A





- Goals/Time Frame


Comments: Per multidisciplinary care plan and goals





- Provider


Provider: Mei KENT RN CRRN





Physical Therapy





- Bed Mobility


Bed Mobility: Verbal Cues, Minimal Assistance





- Transfers


Sit to Stand: Verbal Cues, Contact Guard, Minimal Assistance





- Ambulation


Level of Assistance: Verbal Cues, Contact Guard, Minimal Assistance


Distance (ft.): 50


Assistive Devices: Narrow base quad cane





- Stair Negotiation


Stairs: Level of Assistance: Moderate Assistance


Number of Stairs: 2


Stairs: Assistive Devices: Left Handrail





- Standing Balance


Static Stand: Minimal Assistance


Dynamic Stand: Minimal Assistance, Moderate Assistance





- Pain


Pain (assessed during therapy session): 0





- Insight/Carryover


Insight/Carryover: Fair





- Patient/Family Education


Comment: -safety, therapy schedule, therapy goals, stroke recovery, length of 

stay, POC, use of call bell, mobility, in room exercises, postural control, 

motor control patterns, DC planning





- Assessment/Plan


Assessment: Pt is agreeable to participate in 1:1 and group recreation therapy 

sessions throughout her stay on unit. Pt is oriented to direction following and 

memory related tasks. Pt requires min A throughout sessions for carryover of 

task rules and repetition of commands 2' hearing deficits. Pt enjoys playing 

keyboard during her free time and is provided with more songs to play. Pt at 

times will do hand over hand assist when playing with R hand. Pt plays keyboard 

with L hand. Pt enjoys going outside for diversion and socialization. Pt's mood 

continues to be stable-positive and will continue to benefit from participating 

in recreation therapy sessions throughout stay on unit.





- Goals


Timeframe: 7 days


Goals: -bed/mat mobility with CG.  -sit to/from stand with NBQC with CS.  -SPT 

with NBQC with min A.  -100 feet with NBQC with CG.  -1 flight of steps with L 

rail and min A





- Provider


Therapist: Dipika Stinson PT, DPT


License Number: [id number]





Occupational Therapy





- Arousal/Attention/Orientation


Patient Orientation: Person, Place, Time, Appropriate to Age, Appropriate to 

Situation





- ADL/IADL


Self Feeding: Supervision, Verbal Cues, Set-up Help


Grooming: Supervision, Verbal Cues, Set-up Help


Dressing-Upper Extremity: Verbal Cues, Minimal Assistance, Moderate Assistance


Dressing-Lower Extremity: Moderate Assistance, Maximum Assistance





- Sitting Balance


Static Sitting: Supervision


Dynamic Sitting: Reaches across midline, Reaches out of base of support, 

Requires supervision, Contact Guard Assist





- Transfers


Wheelchair to Bed Transfers: Verbal Cues, Minimal Assistance, Moderate 

Assistance


Toilet Transfers: Verbal Cues, Minimal Assistance, Moderate Assistance





- Wheelchair Management


Level of Assistance: Contact Guard, Minimal Assistance


Distance (ft.): 80





- Upper Extremity Status


Right Upper Extremity Comment: PROM: WFL.  MMT: increased isolation.  R 

shoulder abduction/adduction, extension: 2/5.  R elbow flex: 2+/5 to 3-/5.  R 

foreram: 2-/5.  R wrist flex/extension 2-/5.  -mass grasp flexion: 1/2 range


Left Upper Extremity Comment: L UE AROM: WNL.  Strength: 4/5





- Pain


Pain (assessed during therapy session): 0





- Insight/Carryover


Insight/Carryover: Fair





- Patient/Family Education


Comment: -safety, therapy schedule, therapy goals, stroke recovery, length of 

stay, POC, use of call bell, mobility, in room exercises, postural control, 

motor control patterns, DC planning





- Assessment/Plan


Assessment: Pt is agreeable to participate in 1:1 and group recreation therapy 

sessions throughout her stay on unit. Pt is oriented to direction following and 

memory related tasks. Pt requires min A throughout sessions for carryover of 

task rules and repetition of commands 2' hearing deficits. Pt enjoys playing 

keyboard during her free time and is provided with more songs to play. Pt at 

times will do hand over hand assist when playing with R hand. Pt plays keyboard 

with L hand. Pt enjoys going outside for diversion and socialization. Pt's mood 

continues to be stable-positive and will continue to benefit from participating 

in recreation therapy sessions throughout stay on unit.





- Goals


Timeframe: 7 days


Goals: -bed/mat mobility with CG.  -sit to/from stand with NBQC with CS.  -SPT 

with NBQC with min A.  -100 feet with NBQC with CG.  -1 flight of steps with L 

rail and min A





- Provider


Therapist: CECY Valdes


License Number: [id number]





Speech Therapy





- Consult Information


Patient on Program: Yes


Medical Diagnosis: CVA


Treatment Diagnosis: -MILD COGNITIVE-LINGUISTIC DEFICITS.  -MILD DYSARTHRIA





- Assessment


Memory Impairment: Mild


Speech/Articulation Impairment: Mild





- Plan


Assessment: Pt is agreeable to participate in 1:1 and group recreation therapy 

sessions throughout her stay on unit. Pt is oriented to direction following and 

memory related tasks. Pt requires min A throughout sessions for carryover of 

task rules and repetition of commands 2' hearing deficits. Pt enjoys playing 

keyboard during her free time and is provided with more songs to play. Pt at 

times will do hand over hand assist when playing with R hand. Pt plays keyboard 

with L hand. Pt enjoys going outside for diversion and socialization. Pt's mood 

continues to be stable-positive and will continue to benefit from participating 

in recreation therapy sessions throughout stay on unit.


Plan: Continue Speech/Language Therapy





- Provider


Therapist: Bhumi Oh


License Number: [id number]





Recreational Therapy





- Participation


Participation: Participates in Individual and/or Group Sessions





- Attendance


Attendance: 3-5 times per week





- Activities


Leisure Activities: Cards and Games





- Socialization


Level of Socialization: Initiates/interacts freely with care givers and peer





- Diversional Time


Diversional Time: participates in recreation therapy sessions, plays keyboard





- Assessment


Assessment/Plan: Pt is agreeable to participate in 1:1 and group recreation 

therapy sessions throughout her stay on unit. Pt is oriented to direction 

following and memory related tasks. Pt requires min A throughout sessions for 

carryover of task rules and repetition of commands 2' hearing deficits. Pt 

enjoys playing keyboard during her free time and is provided with more songs to 

play. Pt at times will do hand over hand assist when playing with R hand. Pt 

plays keyboard with L hand. Pt enjoys going outside for diversion and 

socialization. Pt's mood continues to be stable-positive and will continue to 

benefit from participating in recreation therapy sessions throughout stay on 

unit.


Problems Currently Limiting Participation: decrease leisure awareness level, 

limited family support, hard of hearing, R side weakness


Goals and Time Frame: Pt will be encouraged to participate in 1:1 and group 

recreation therapy sessions to improve direction following, leisure awareness 

level, attention to task, arousal level, and improve activity tolerance level.





- Provider


Therapist: Maria Esther Newman, CTRS #[id number]





Nutrition





- Current Diet


Current Diet/ Supplement/ Feedings: Low consistent CHO, mech altered (finely 

chopped) diet with thin liquids





- Appetite


Percent Meal Consumed: %





- Comments


Comments: N/A





- Assessment/Goals/Time Frame


Assessment/Goals/Time Frame: Pt is at moderate nutritional risk.  Goals-.  1.  

Pt to consume % of meals (met, continue).  2.  Blood glucoses to be 

between  mg/dl(met, continue).  Follow-up assessment due by 6/5/2018.





- Provider


Provider: Carley Gabriel MS, RD





Case Management





- Psychosocial Assessment


Support Systems: Elissa Fernandez (sibling)- [phone].  Rhodora Hill (niece)

- [phone].  Olinda (niece/POA)- [phone]


Psychological Interventions/Needs: Patient is alert with mild cognitive 

impairments


Discharge Concerns: Patient with very little support at home, lives alone, 

currently requiring min-mod A overall for functional mobility, incontinent of 

bowel and bladder.  Pt with complaints of being unable to sleep at night; 

Physiatry also aware


Patient/Family Meeting: CM met with patient and rehab team


Intervention/Goal/Outcome:: 1. Plan: [facility] at [facility] as patient very 

concerned about discharge home as she has very little social support. 2. 

continued emotional support. 3. continued stay auth, LAD: 5/24, updates to be 

faxed at that time. 4. tentative discharge date: 6/1/2018





- Discharge Plan


Discharge Plan: Subacute care





- Provider


[facility]


License Number: [id number]





Rehabilitation Plan





- Treatment Plan


Treatment Plan: Physical Therapy, Occupational Therapy, Speech, Dietary, Patient

/Family Education





- Recommendation


Recommendation: Physical Therapy, Occupational Therapy, Dietary, Patient/Family 

Education





- Discharge Plan


Discharge to: Subacute

## 2018-05-31 RX ADMIN — ENOXAPARIN SODIUM SCH MG: 40 INJECTION SUBCUTANEOUS at 08:51

## 2018-05-31 RX ADMIN — INSULIN LISPRO SCH: 100 INJECTION, SOLUTION INTRAVENOUS; SUBCUTANEOUS at 06:41

## 2018-05-31 RX ADMIN — PANTOPRAZOLE SODIUM SCH MG: 40 TABLET, DELAYED RELEASE ORAL at 06:40

## 2018-05-31 RX ADMIN — NIFEDIPINE SCH MG: 30 TABLET, FILM COATED, EXTENDED RELEASE ORAL at 09:04

## 2018-05-31 NOTE — CP.PCM.PN
Objective





- Vital Signs/Intake and Output


Vital Signs (last 24 hours): 


 











Temp Pulse Resp BP Pulse Ox


 


 97.7 F   66   18   118/55 L  97 


 


 05/31/18 07:48  05/31/18 07:48  05/31/18 07:48  05/31/18 07:48  05/31/18 07:48











- Medications


Medications: 


 Current Medications





Acetaminophen (Tylenol 325mg Tab)  325 mg PO Q6 PRN


   PRN Reason: Pain 1-10


   Last Admin: 05/18/18 17:35 Dose:  325 mg


Al Hydrox/Mg Hydrox/Simethicone (Maalox Plus 30 Ml)  30 ml PO Q6 PRN


   PRN Reason: Indigestion / Heartburn


   Last Admin: 05/29/18 21:49 Dose:  30 ml


Aspirin (Aspirin Chewable)  81 mg PO DAILY [facility]


   Last Admin: 05/30/18 08:32 Dose:  81 mg


Atorvastatin Calcium (Lipitor)  40 mg PO HS [facility]


   Last Admin: 05/30/18 21:46 Dose:  40 mg


Clotrimazole (Lotrimin 1% Cream)  1 applic TOP 0600,1800 [facility]


   Last Admin: 05/31/18 06:40 Dose:  1 applic


Docusate Sodium (Colace)  100 mg PO HS [facility]


   Last Admin: 05/30/18 21:46 Dose:  100 mg


Enoxaparin Sodium (Lovenox)  40 mg SC DAILY [facility]


   PRN Reason: Protocol


   Last Admin: 05/30/18 08:32 Dose:  40 mg


Hydralazine HCl (Apresoline)  50 mg PO Q8 [facility]


   Last Admin: 05/31/18 06:40 Dose:  50 mg


Insulin Human Lispro (Humalog)  0 units SC 0630,1630 [facility]


   PRN Reason: Protocol


   Last Admin: 05/31/18 06:41 Dose:  Not Given


Lactulose (Enulose)  10 gm PO DAILY [facility]


   PRN Reason: Constipation


Levothyroxine Sodium (Synthroid)  25 mcg PO 0630 [facility]


   Last Admin: 05/31/18 06:40 Dose:  25 mcg


Lorazepam (Ativan)  0.5 mg PO HS PRN


   PRN Reason: Insomnia


   Last Admin: 05/30/18 21:47 Dose:  0.5 mg


Losartan Potassium (Cozaar)  50 mg PO DAILY [facility]


   Last Admin: 05/30/18 08:32 Dose:  50 mg


Metformin HCl (Glucophage)  1,000 mg PO BIDWM [facility]


   Last Admin: 05/30/18 17:08 Dose:  1,000 mg


Metoprolol Tartrate (Lopressor)  100 mg PO Q12 [facility]


   Last Admin: 05/30/18 21:47 Dose:  100 mg


Nifedipine (Procardia Xl)  30 mg PO DAILY [facility]


   Last Admin: 05/30/18 08:33 Dose:  30 mg


Pantoprazole Sodium (Protonix Ec Tab)  40 mg PO 0630 [facility]


   Last Admin: 05/31/18 06:40 Dose:  40 mg


Sitagliptin Phosphate (Januvia)  100 mg PO DAILY [facility]


   Last Admin: 05/30/18 08:33 Dose:  100 mg


Tamsulosin HCl (Flomax)  0.4 mg PO HS [facility]


   Last Admin: 05/30/18 21:46 Dose:  0.4 mg


Tolvaptan (Samsca)  15 mg PO MWF [facility]


   Last Admin: 05/30/18 14:37 Dose:  Not Given











- Labs


Labs: 


 





 05/30/18 06:30 





 05/30/18 06:30

## 2018-05-31 NOTE — CP.PCM.PN
Subjective





- Date & Time of Evaluation


Date of Evaluation: 05/31/18


Time of Evaluation: 10:52





- Subjective


Subjective: 





No significant changes clinically


To repeat serum sodium tomorrow and reevaluate tomorrow.


Samsca has been cut down to 3 times a week we will monitoring serum sodium





Objective





- Vital Signs/Intake and Output


Vital Signs (last 24 hours): 


 











Temp Pulse Resp BP Pulse Ox


 


 97.7 F   66   18   118/55 L  97 


 


 05/31/18 07:48  05/31/18 09:04  05/31/18 07:48  05/31/18 09:04  05/31/18 07:48











- Medications


Medications: 


 Current Medications





Acetaminophen (Tylenol 325mg Tab)  325 mg PO Q6 PRN


   PRN Reason: Pain 1-10


   Last Admin: 05/18/18 17:35 Dose:  325 mg


Al Hydrox/Mg Hydrox/Simethicone (Maalox Plus 30 Ml)  30 ml PO Q6 PRN


   PRN Reason: Indigestion / Heartburn


   Last Admin: 05/29/18 21:49 Dose:  30 ml


Aspirin (Aspirin Chewable)  81 mg PO DAILY [facility]


   Last Admin: 05/31/18 08:52 Dose:  81 mg


Atorvastatin Calcium (Lipitor)  40 mg PO HS [facility]


   Last Admin: 05/30/18 21:46 Dose:  40 mg


Docusate Sodium (Colace)  100 mg PO HS [facility]


   Last Admin: 05/30/18 21:46 Dose:  100 mg


Enoxaparin Sodium (Lovenox)  40 mg SC DAILY [facility]


   PRN Reason: Protocol


   Last Admin: 05/31/18 08:51 Dose:  40 mg


Hydralazine HCl (Apresoline)  50 mg PO Q8 [facility]


   Last Admin: 05/31/18 06:40 Dose:  50 mg


Lactulose (Enulose)  10 gm PO DAILY [facility]


   PRN Reason: Constipation


Levothyroxine Sodium (Synthroid)  25 mcg PO 0630 [facility]


   Last Admin: 05/31/18 06:40 Dose:  25 mcg


Lorazepam (Ativan)  0.5 mg PO HS [facility]N


   PRN Reason: Insomnia


   Last Admin: 05/30/18 21:47 Dose:  0.5 mg


Losartan Potassium (Cozaar)  50 mg PO DAILY [facility]


   Last Admin: 05/31/18 08:52 Dose:  50 mg


Metformin HCl (Glucophage)  1,000 mg PO BIDWM [facility]


   Last Admin: 05/31/18 08:52 Dose:  1,000 mg


Metoprolol Tartrate (Lopressor)  100 mg PO Q12 [facility]


   Last Admin: 05/31/18 08:53 Dose:  100 mg


Nifedipine (Procardia Xl)  30 mg PO DAILY [facility]


   Last Admin: 05/31/18 09:04 Dose:  30 mg


Pantoprazole Sodium (Protonix Ec Tab)  40 mg PO 0630 [facility]


   Last Admin: 05/31/18 06:40 Dose:  40 mg


Sitagliptin Phosphate (Januvia)  100 mg PO DAILY [facility]


   Last Admin: 05/31/18 08:53 Dose:  100 mg


Tamsulosin HCl (Flomax)  0.4 mg PO HS [facility]


   Last Admin: 05/30/18 21:46 Dose:  0.4 mg


Tolvaptan (Samsca)  15 mg PO MWF [facility]


   Last Admin: 05/30/18 14:37 Dose:  Not Given











- Labs


Labs: 


 





 05/30/18 06:30 





 05/30/18 06:30 











Assessment and Plan


(1) Hyponatremia


Status: Acute

## 2018-06-01 VITALS — RESPIRATION RATE: 20 BRPM

## 2018-06-01 LAB
BUN SERPL-MCNC: 29 MG/DL (ref 7–17)
CALCIUM SERPL-MCNC: 9 MG/DL (ref 8.4–10.2)
GFR NON-AFRICAN AMERICAN: > 60

## 2018-06-01 RX ADMIN — ENOXAPARIN SODIUM SCH MG: 40 INJECTION SUBCUTANEOUS at 08:52

## 2018-06-01 RX ADMIN — PANTOPRAZOLE SODIUM SCH MG: 40 TABLET, DELAYED RELEASE ORAL at 06:02

## 2018-06-01 RX ADMIN — NIFEDIPINE SCH MG: 30 TABLET, FILM COATED, EXTENDED RELEASE ORAL at 08:54

## 2018-06-01 NOTE — CP.PCM.CON
History of Present Illness





- History of Present Illness


History of Present Illness: 





Pt seen for supportive therapy 9:50-10:10. Pt discussed gains over the week, 

use of ankle, beginning her walking.  Pt spoke of desire for continued gains 

and improved status.  Dysphoria reported, pt spoke of her future and desire for 

increased independence for reduced dependency on others.  Pt processing stroke 

issues.


plan: Continued sup therapy





Past Patient History





- Infectious Disease


Hx of Infectious Diseases: None





- Tetanus Immunizations


Tetanus Immunization: Up to Date





- Past Medical History & Family History


Past Medical History?: Yes





- Past Social History


Smoking Status: Never Smoked





- CARDIAC


Hx Hypertension: Yes





- PULMONARY


Hx Asthma: Yes





- NEUROLOGICAL


HX Cerebrovascular Accident: Yes (4/23/2018)


Hx Paralysis: No


Other/Comment: meningioma





- HEENT


Hx HEENT Problems: No





- ENDOCRINE/METABOLIC


Hx Diabetes Mellitus Type 2: Yes


Hx Hypothyroidism: Yes





- HEMATOLOGICAL/ONCOLOGICAL


Hx Cancer: Yes





- MUSCULOSKELETAL/RHEUMATOLOGICAL


Hx Falls: No


Hx Osteoporosis: Yes





- GASTROINTESTINAL


Hx Gastrointestinal Disorders: No


Other/Comment: + ISCHEMIC COLITIS , COLONIC POLYPS





- GENITOURINARY/GYNECOLOGICAL


Hx Genitourinary Disorders: No


Hx Incontinence: Yes





- PSYCHIATRIC


Hx Anxiety: Yes


Hx Substance Use: No





- SURGICAL HISTORY


Hx Surgeries: Yes


Hx Breast Biopsy: Yes (left lumpectomy S/P CHEMO)


Hx Mastectomy: Yes (right 1988, S/P CHEMO)


Other/Comment: LAPAROTOMY 1971





- ANESTHESIA


Hx Anesthesia: Yes


Hx Anesthesia Reactions: No


Hx Malignant Hyperthermia: No


Has any member of the family had a problem w/ anesthesia?: No





Meds


Allergies/Adverse Reactions: 


 Allergies











Allergy/AdvReac Type Severity Reaction Status Date / Time


 


iodine Allergy Mild RASH Verified 05/08/18 17:41


 


codeine Allergy  ANAPHYLAXIS Verified 05/08/18 17:41


 


FISH Allergy  ANAPHYLAXIS Verified 05/08/18 17:41


 


Penicillins Allergy  RASH Verified 05/08/18 17:41


 


shellfish derived Allergy  ANAPHYLAXIS Verified 05/08/18 17:41


 


Sulfa (Sulfonamide Allergy  ANAPHYLAXIS Verified 05/08/18 17:41





Antibiotics)     














- Medications


Medications: 


 Current Medications





Acetaminophen (Tylenol 325mg Tab)  325 mg PO Q6 PRN


   PRN Reason: Pain 1-10


   Last Admin: 05/18/18 17:35 Dose:  325 mg


Al Hydrox/Mg Hydrox/Simethicone (Maalox Plus 30 Ml)  30 ml PO Q6 PRN


   PRN Reason: Indigestion / Heartburn


   Last Admin: 05/29/18 21:49 Dose:  30 ml


Aspirin (Aspirin Chewable)  81 mg PO DAILY [facility]


   Last Admin: 06/01/18 08:53 Dose:  81 mg


Atorvastatin Calcium (Lipitor)  40 mg PO HS [facility]


   Last Admin: 05/31/18 21:04 Dose:  40 mg


Docusate Sodium (Colace)  100 mg PO HS [facility]


   Last Admin: 05/31/18 21:04 Dose:  100 mg


Enoxaparin Sodium (Lovenox)  40 mg SC DAILY [facility]


   PRN Reason: Protocol


   Last Admin: 06/01/18 08:52 Dose:  40 mg


Hydralazine HCl (Apresoline)  50 mg PO Q8 [facility]


   Last Admin: 06/01/18 06:02 Dose:  50 mg


Lactulose (Enulose)  10 gm PO DAILY [facility]


   PRN Reason: Constipation


Levothyroxine Sodium (Synthroid)  25 mcg PO 0630 [facility]


   Last Admin: 06/01/18 06:02 Dose:  25 mcg


Lorazepam (Ativan)  0.25 mg PO HS [facility]N


   PRN Reason: Insomnia


Losartan Potassium (Cozaar)  50 mg PO DAILY [facility]


   Last Admin: 06/01/18 08:54 Dose:  50 mg


Metformin HCl (Glucophage)  1,000 mg PO BIDWM [facility]


   Last Admin: 06/01/18 08:53 Dose:  1,000 mg


Metoprolol Tartrate (Lopressor)  100 mg PO Q12 [facility]


   Last Admin: 06/01/18 08:52 Dose:  100 mg


Nifedipine (Procardia Xl)  30 mg PO DAILY [facility]


   Last Admin: 06/01/18 08:54 Dose:  30 mg


Pantoprazole Sodium (Protonix Ec Tab)  40 mg PO 0630 [facility]


   Last Admin: 06/01/18 06:02 Dose:  40 mg


Sitagliptin Phosphate (Januvia)  100 mg PO DAILY [facility]


   Last Admin: 06/01/18 08:53 Dose:  100 mg


Tamsulosin HCl (Flomax)  0.4 mg PO HS [facility]


   Last Admin: 05/31/18 21:04 Dose:  0.4 mg


Tolvaptan (Samsca)  15 mg PO MWF [facility]


   Last Admin: 06/01/18 08:56 Dose:  15 mg











Results





- Vital Signs


Recent Vital Signs: 


 Last Vital Signs











Temp  98.1 F   06/01/18 08:31


 


Pulse  90   06/01/18 08:54


 


Resp  18   06/01/18 08:31


 


BP  149/99 H  06/01/18 08:54


 


Pulse Ox  95   06/01/18 08:31














- Labs


Result Diagrams: 


 05/30/18 06:30





 06/01/18 06:30


Labs: 


 Laboratory Results - last 24 hr











  05/31/18 06/01/18





  15:30 06:30


 


Sodium   135


 


Potassium   4.0


 


Chloride   104


 


Carbon Dioxide   20 L


 


Anion Gap   15


 


BUN   29 H


 


Creatinine   0.9


 


Est GFR ( Amer)   > 60


 


Est GFR (Non-Af Amer)   > 60


 


POC Glucose (mg/dL)  113 H 


 


Random Glucose   116 H


 


Calcium   9.0

## 2018-06-01 NOTE — CP.PCM.PN
Subjective





- Date & Time of Evaluation


Date of Evaluation: 06/01/18


Time of Evaluation: 14:11





- Subjective


Subjective: 





patient up and around


No chest pain no shortness of breath


Appetite is good





Objective





- Vital Signs/Intake and Output


Vital Signs (last 24 hours): 


 











Temp Pulse Resp BP Pulse Ox


 


 98.1 F   90   18   149/99 H  95 


 


 06/01/18 08:31  06/01/18 13:18  06/01/18 08:31  06/01/18 13:18  06/01/18 08:31











- Medications


Medications: 


 Current Medications





Acetaminophen (Tylenol 325mg Tab)  325 mg PO Q6 PRN


   PRN Reason: Pain 1-10


   Last Admin: 05/18/18 17:35 Dose:  325 mg


Al Hydrox/Mg Hydrox/Simethicone (Maalox Plus 30 Ml)  30 ml PO Q6 PRN


   PRN Reason: Indigestion / Heartburn


   Last Admin: 05/29/18 21:49 Dose:  30 ml


Aspirin (Aspirin Chewable)  81 mg PO DAILY [facility]


   Last Admin: 06/01/18 08:53 Dose:  81 mg


Atorvastatin Calcium (Lipitor)  40 mg PO HS [facility]


   Last Admin: 05/31/18 21:04 Dose:  40 mg


Docusate Sodium (Colace)  100 mg PO HS [facility]


   Last Admin: 05/31/18 21:04 Dose:  100 mg


Enoxaparin Sodium (Lovenox)  40 mg SC DAILY [facility]


   PRN Reason: Protocol


   Last Admin: 06/01/18 08:52 Dose:  40 mg


Hydralazine HCl (Apresoline)  50 mg PO Q8 [facility]


   Last Admin: 06/01/18 13:18 Dose:  50 mg


Lactulose (Enulose)  10 gm PO DAILY PRN


   PRN Reason: Constipation


Levothyroxine Sodium (Synthroid)  25 mcg PO 0630 [facility]


   Last Admin: 06/01/18 06:02 Dose:  25 mcg


Lorazepam (Ativan)  0.25 mg PO HS PRN


   PRN Reason: Insomnia


Losartan Potassium (Cozaar)  50 mg PO DAILY [facility]


   Last Admin: 06/01/18 08:54 Dose:  50 mg


Metformin HCl (Glucophage)  1,000 mg PO BIDWM [facility]


   Last Admin: 06/01/18 08:53 Dose:  1,000 mg


Metoprolol Tartrate (Lopressor)  100 mg PO Q12 [facility]


   Last Admin: 06/01/18 08:52 Dose:  100 mg


Nifedipine (Procardia Xl)  30 mg PO DAILY [facility]


   Last Admin: 06/01/18 08:54 Dose:  30 mg


Pantoprazole Sodium (Protonix Ec Tab)  40 mg PO 0630 [facility]


   Last Admin: 06/01/18 06:02 Dose:  40 mg


Sitagliptin Phosphate (Januvia)  100 mg PO DAILY [facility]


   Last Admin: 06/01/18 08:53 Dose:  100 mg


Tamsulosin HCl (Flomax)  0.4 mg PO HS [facility]


   Last Admin: 05/31/18 21:04 Dose:  0.4 mg


Tolvaptan (Samsca)  15 mg PO MWF [facility]


   Last Admin: 06/01/18 08:56 Dose:  15 mg











- Labs


Labs: 


 





 05/30/18 06:30 





 06/01/18 06:30 











- Constitutional


Appears: No Acute Distress





- ENT Exam


ENT Exam: Mucous Membranes Moist





- Neck Exam


Neck Exam: absent: Lymphadenopathy





- Respiratory Exam


Respiratory Exam: absent: Chest Wall Tenderness





- GI/Abdominal Exam


GI & Abdominal Exam: Soft, Normal Bowel Sounds.  absent: Distended, Guarding





- Extremities Exam


Extremities Exam: absent: Calf Tenderness





- Back Exam


Back Exam: absent: CVA tenderness (L), CVA tenderness (R)





- Neurological Exam


Neurological Exam: Alert





- Psychiatric Exam


Psychiatric exam: Normal Affect





- Skin


Skin Exam: absent: Cyanosis





Assessment and Plan


(1) Hyponatremia


Assessment & Plan: 


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


DM, HTN, hx of breast CA





Plan


serum sodium ranging between 133-135 which has been stable on Samsca 15 mg 

Monday Wednesday Friday we change it lately kidney monitoring.


Patient is stable





Status: Acute

## 2018-06-02 VITALS — SYSTOLIC BLOOD PRESSURE: 130 MMHG | DIASTOLIC BLOOD PRESSURE: 80 MMHG | HEART RATE: 78 BPM

## 2018-06-02 VITALS — TEMPERATURE: 97 F | OXYGEN SATURATION: 97 %

## 2018-06-02 LAB
BUN SERPL-MCNC: 28 MG/DL (ref 7–17)
CALCIUM SERPL-MCNC: 9.3 MG/DL (ref 8.4–10.2)
ERYTHROCYTE [DISTWIDTH] IN BLOOD BY AUTOMATED COUNT: 14 % (ref 11.5–14.5)
GFR NON-AFRICAN AMERICAN: > 60
HGB BLD-MCNC: 11 G/DL (ref 12–16)
MCH RBC QN AUTO: 30.4 PG (ref 27–31)
MCHC RBC AUTO-ENTMCNC: 33.6 G/DL (ref 33–37)
MCV RBC AUTO: 90.7 FL (ref 81–99)
PLATELET # BLD: 403 K/UL (ref 130–400)
RBC # BLD AUTO: 3.62 MIL/UL (ref 3.8–5.2)
WBC # BLD AUTO: 4.8 K/UL (ref 4.8–10.8)

## 2018-06-02 RX ADMIN — PANTOPRAZOLE SODIUM SCH MG: 40 TABLET, DELAYED RELEASE ORAL at 05:30

## 2018-06-02 RX ADMIN — ENOXAPARIN SODIUM SCH MG: 40 INJECTION SUBCUTANEOUS at 08:29

## 2018-06-02 RX ADMIN — NIFEDIPINE SCH MG: 30 TABLET, FILM COATED, EXTENDED RELEASE ORAL at 08:31

## 2018-06-02 NOTE — CP.PCM.PN
Subjective





- Date & Time of Evaluation


Date of Evaluation: 06/01/18


Time of Evaluation: 11:00





- Subjective


Subjective: 





{atient remans stable


 Scheduled for subacute rehab.


Has no chest pain or SOB


 Noted  to have good sugar control.





Objective





- Vital Signs/Intake and Output


Vital Signs (last 24 hours): 


 











Temp Pulse Resp BP Pulse Ox


 


 97.0 F L  78   20   130/80   97 


 


 06/02/18 08:00  06/02/18 13:30  06/02/18 08:00  06/02/18 13:30  06/02/18 08:00











- Labs


Labs: 


 





 06/02/18 06:00 





 06/02/18 06:00 











Assessment and Plan


(1) CVA (cerebral vascular accident)


Status: Acute   





(2) Gait abnormality


Status: Acute   





(3) Hypertension


Status: Acute   





(4) Diabetes mellitus type 2 in obese


Status: Acute   





(5) Hypothyroidism


Status: Acute

## 2018-06-02 NOTE — CP.PCM.PN
Subjective





- Date & Time of Evaluation


Date of Evaluation: 06/01/18


Time of Evaluation: 09:00





- Subjective


Subjective: 





no acute complaints of pain, claims she is making gains wants to stay





Objective





- Vital Signs/Intake and Output


Vital Signs (last 24 hours): 


 











Temp Pulse Resp BP Pulse Ox


 


 97.0 F L  68   20   119/60   97 


 


 06/02/18 08:00  06/02/18 08:32  06/02/18 08:00  06/02/18 08:32  06/02/18 08:00











- Medications


Medications: 


 Current Medications





Acetaminophen (Tylenol 325mg Tab)  325 mg PO Q6 PRN


   PRN Reason: Pain 1-10


   Last Admin: 05/18/18 17:35 Dose:  325 mg


Al Hydrox/Mg Hydrox/Simethicone (Maalox Plus 30 Ml)  30 ml PO Q6 PRN


   PRN Reason: Indigestion / Heartburn


   Last Admin: 05/29/18 21:49 Dose:  30 ml


Aspirin (Aspirin Chewable)  81 mg PO DAILY [facility]


   Last Admin: 06/02/18 08:29 Dose:  81 mg


Atorvastatin Calcium (Lipitor)  40 mg PO HS [facility]


   Last Admin: 06/01/18 21:14 Dose:  40 mg


Docusate Sodium (Colace)  100 mg PO HS [facility]


   Last Admin: 06/01/18 21:14 Dose:  100 mg


Enoxaparin Sodium (Lovenox)  40 mg SC DAILY [facility]


   PRN Reason: Protocol


   Last Admin: 06/02/18 08:29 Dose:  40 mg


Hydralazine HCl (Apresoline)  50 mg PO Q8 [facility]


   Last Admin: 06/02/18 05:30 Dose:  50 mg


Lactulose (Enulose)  10 gm PO DAILY PRN


   PRN Reason: Constipation


Levothyroxine Sodium (Synthroid)  25 mcg PO 0630 [facility]


   Last Admin: 06/02/18 05:30 Dose:  25 mcg


Lorazepam (Ativan)  0.25 mg PO HS PRN


   PRN Reason: Insomnia


Losartan Potassium (Cozaar)  50 mg PO DAILY [facility]


   Last Admin: 06/02/18 08:32 Dose:  50 mg


Metformin HCl (Glucophage)  1,000 mg PO BIDWM [facility]


   Last Admin: 06/02/18 08:30 Dose:  1,000 mg


Metoprolol Tartrate (Lopressor)  100 mg PO Q12 [facility]


   Last Admin: 06/02/18 08:30 Dose:  100 mg


Nifedipine (Procardia Xl)  30 mg PO DAILY [facility]


   Last Admin: 06/02/18 08:31 Dose:  30 mg


Pantoprazole Sodium (Protonix Ec Tab)  40 mg PO 0630 [facility]


   Last Admin: 06/02/18 05:30 Dose:  40 mg


Sitagliptin Phosphate (Januvia)  100 mg PO DAILY [facility]


   Last Admin: 06/02/18 08:29 Dose:  100 mg


Tamsulosin HCl (Flomax)  0.4 mg PO HS [facility]


   Last Admin: 06/01/18 21:14 Dose:  0.4 mg


Tolvaptan (Samsca)  15 mg PO MWF [facility]


   Last Admin: 06/01/18 08:56 Dose:  15 mg











- Labs


Labs: 


 





 06/02/18 06:00 





 06/02/18 06:00 











- Head Exam


Head Exam: ATRAUMATIC, NORMAL INSPECTION, NORMOCEPHALIC





- Eye Exam


Eye Exam: EOMI, Normal appearance


Pupil Exam: NORMAL ACCOMODATION, PERRL





- ENT Exam


ENT Exam: Mucous Membranes Moist, Normal Exam





- Neck Exam


Neck Exam: Full ROM, Normal Inspection





- Respiratory Exam


Respiratory Exam: Clear to Ausculation Bilateral, NORMAL BREATHING PATTERN





- Cardiovascular Exam


Cardiovascular Exam: REGULAR RHYTHM





- GI/Abdominal Exam


GI & Abdominal Exam: Soft, Normal Bowel Sounds





- Rectal Exam


Rectal Exam: NORMAL INSPECTION





-  Exam


External exam: NORMAL EXTERNAL EXAM





- Extremities Exam


Extremities Exam: Full ROM, Normal Capillary Refill, Normal Inspection





- Back Exam


Back Exam: NORMAL INSPECTION





- Neurological Exam


Neurological Exam: Alert, Awake


Neuro motor strength exam: Right Upper Extremity: 2/1, Right Lower Extremity: 2/

1





- Psychiatric Exam


Psychiatric exam: Normal Affect, Normal Mood





- Skin


Skin Exam: Dry, Normal Color





Assessment and Plan


(1) CVA (cerebral vascular accident)


Assessment & Plan: 


plan for continuation of physical, occupational, rec and speech therapy patient 

wants to stay in acute rehab for a  a while before Dc to subacute 


Status: Acute   





(2) Hyponatremia


Status: Acute   





(3) Anxiety


Status: Acute   





(4) Benign hypertension


Status: Acute   





(5) HTN (hypertension)


Status: Acute

## 2018-06-02 NOTE — CP.PCM.DIS
Provider





- Provider


Date of Admission: 


05/08/18 17:13





Attending physician: 


Justin Hernandez MD








Diagnosis





- Discharge Diagnosis


(1) CVA (cerebral vascular accident)


Status: Acute   





(2) Gait abnormality


Status: Acute   





(3) Hypertension


Status: Acute   





(4) Diabetes mellitus type 2 in obese


Status: Acute   





(5) Hypothyroidism


Status: Acute   





Hospital Course





- Lab Results


Lab Results: 


 Micro Results





05/17/18 08:15   Urine,Catheterized   Urine Culture - Final


                            Klebsiella Pneumoniae Ssp Pneu





 Most Recent Lab Values











WBC  4.8 K/uL (4.8-10.8)   06/02/18  06:00    


 


RBC  3.62 Mil/uL (3.80-5.20)  L  06/02/18  06:00    


 


Hgb  11.0 g/dL (12.0-16.0)  L  06/02/18  06:00    


 


Hct  32.8 % (34.0-47.0)  L  06/02/18  06:00    


 


MCV  90.7 fl (81.0-99.0)   06/02/18  06:00    


 


MCH  30.4 pg (27.0-31.0)   06/02/18  06:00    


 


MCHC  33.6 g/dL (33.0-37.0)   06/02/18  06:00    


 


RDW  14.0 % (11.5-14.5)   06/02/18  06:00    


 


Plt Count  403 K/uL (130-400)  H  06/02/18  06:00    


 


MPV  6.5 fl (7.2-11.7)  L  05/17/18  11:49    


 


Neut % (Auto)  73.8 % (50.0-75.0)   05/17/18  11:49    


 


Lymph % (Auto)  7.4 % (20.0-40.0)  L  05/17/18  11:49    


 


Mono % (Auto)  11.9 % (0.0-10.0)  H  05/17/18  11:49    


 


Eos % (Auto)  6.1 % (0.0-4.0)  H  05/17/18  11:49    


 


Baso % (Auto)  0.8 % (0.0-2.0)   05/17/18  11:49    


 


Neut # (Auto)  3.9 K/uL (1.8-7.0)   05/17/18  11:49    


 


Lymph # (Auto)  0.4 K/uL (1.0-4.3)  L  05/17/18  11:49    


 


Mono # (Auto)  0.6 K/uL (0.0-0.8)   05/17/18  11:49    


 


Eos # (Auto)  0.3 K/uL (0.0-0.7)   05/17/18  11:49    


 


Baso # (Auto)  0.0 K/uL (0.0-0.2)   05/17/18  11:49    


 


Neutrophils % (Manual)  83 % (42-75)  H  05/17/18  11:49    


 


Band Neutrophils %  3 % (0-2)  H  05/17/18  11:49    


 


Lymphocytes % (Manual)  2 % (20-50)  L  05/17/18  11:49    


 


Reactive Lymphs %  1 % (0-0)  H  05/17/18  11:49    


 


Monocytes % (Manual)  7 % (0-10)   05/17/18  11:49    


 


Eosinophils % (Manual)  4 % (0-7)   05/17/18  11:49    


 


Platelet Estimate  Normal  (NORMAL)   05/17/18  11:49    


 


Hypochromasia (manual)  Slight   05/17/18  11:49    


 


Sodium  137 mmol/l (132-148)   06/02/18  06:00    


 


Potassium  4.4 MMOL/L (3.6-5.0)   06/02/18  06:00    


 


Chloride  104 mmol/L ()   06/02/18  06:00    


 


Carbon Dioxide  20 mmol/L (22-30)  L  06/02/18  06:00    


 


Anion Gap  17  (10-20)   06/02/18  06:00    


 


BUN  28 mg/dl (7-17)  H  06/02/18  06:00    


 


Creatinine  0.9 mg/dl (0.7-1.2)   06/02/18  06:00    


 


Est GFR ( Amer)  > 60   06/02/18  06:00    


 


Est GFR (Non-Af Amer)  > 60   06/02/18  06:00    


 


POC Glucose (mg/dL)  113 mg/dL ()  H  05/31/18  15:30    


 


Random Glucose  118 mg/dL ()  H  06/02/18  06:00    


 


Hemoglobin A1c  7.5 % (4.2-6.5)  H  05/09/18  05:40    


 


Calcium  9.3 mg/dL (8.4-10.2)   06/02/18  06:00    


 


Total Bilirubin  0.2 mg/dl (0.2-1.3)   05/21/18  05:45    


 


AST  34 U/L (14-36)   05/21/18  05:45    


 


ALT  54 U/L (9-52)  H D 05/21/18  05:45    


 


Alkaline Phosphatase  55 U/L ()   05/21/18  05:45    


 


Total Protein  7.0 G/DL (6.3-8.2)   05/21/18  05:45    


 


Albumin  3.7 g/dL (3.5-5.0)   05/21/18  05:45    


 


Globulin  3.3 gm/dL (2.2-3.9)   05/21/18  05:45    


 


Albumin/Globulin Ratio  1.1  (1.0-2.1)   05/21/18  05:45    


 


Triglycerides  99 mg/DL (0-149)   05/09/18  05:40    


 


Cholesterol  136 mg/dL (0-199)   05/09/18  05:40    


 


LDL Cholesterol Direct  68 mg/dL (0-129)   05/09/18  05:40    


 


HDL Cholesterol  40 MG/DL (30-70)   05/09/18  05:40    


 


TSH 3rd Generation  4.32 mIU/ML (0.46-4.68)   05/09/18  05:40    


 


Urine Color  Yellow  (YELLOW)   05/17/18  08:15    


 


Urine Clarity  Cloudy  (Clear)   05/17/18  08:15    


 


Urine pH  7.0  (5.0-8.0)   05/17/18  08:15    


 


Ur Specific Gravity  1.011  (1.003-1.030)   05/17/18  08:15    


 


Urine Protein  Negative mg/dL (NEGATIVE)   05/17/18  08:15    


 


Urine Glucose (UA)  Neg mg/dL (Normal)   05/17/18  08:15    


 


Urine Ketones  Negative mg/dL (NEGATIVE)   05/17/18  08:15    


 


Urine Blood  Negative  (NEGATIVE)   05/17/18  08:15    


 


Urine Nitrate  Negative  (NEGATIVE)   05/17/18  08:15    


 


Urine Bilirubin  Negative  (NEGATIVE)   05/17/18  08:15    


 


Urine Urobilinogen  0.2-1.0 mg/dL (0.2-1.0)   05/17/18  08:15    


 


Ur Leukocyte Esterase  Large Kyaw/uL (Negative)   05/17/18  08:15    


 


Urine RBC (Auto)  3 /hpf (0-3)   05/17/18  08:15    


 


Urine Microscopic WBC  70 /hpf (0-5)  H  05/17/18  08:15    


 


Urine Bacteria  Rare  (<OCC)   05/17/18  08:15    


 


Urine Osmolality  503 mosm/kg (300-1000)   05/14/18  13:17    


 


Ur Random Sodium  47 mmol/L  05/14/18  13:17    














- Hospital Course


Hospital Course: 





This is an 81 y/o female admitted for rehab post CVA.





Discharge Exam





- Head Exam


Head Exam: ATRAUMATIC, NORMAL INSPECTION, NORMOCEPHALIC





Discharge Plan





- Follow Up Plan


Condition: GOOD


Disposition: REHAB FACILITY/REHAB UNIT


Instructions:  Weakness (GEN), Hypertension (DC), Hypertension (GEN)